# Patient Record
Sex: MALE | Race: WHITE | NOT HISPANIC OR LATINO | Employment: OTHER | URBAN - METROPOLITAN AREA
[De-identification: names, ages, dates, MRNs, and addresses within clinical notes are randomized per-mention and may not be internally consistent; named-entity substitution may affect disease eponyms.]

---

## 2023-04-09 DIAGNOSIS — I10 ESSENTIAL (PRIMARY) HYPERTENSION: ICD-10-CM

## 2023-04-10 RX ORDER — LOSARTAN POTASSIUM AND HYDROCHLOROTHIAZIDE 12.5; 1 MG/1; MG/1
TABLET ORAL
Qty: 90 TABLET | Refills: 3 | Status: SHIPPED | OUTPATIENT
Start: 2023-04-10 | End: 2023-10-13 | Stop reason: ALTCHOICE

## 2023-04-10 RX ORDER — ROSUVASTATIN CALCIUM 40 MG/1
1 TABLET, COATED ORAL DAILY
COMMUNITY
Start: 2023-04-06 | End: 2023-11-01

## 2023-04-10 RX ORDER — LOSARTAN POTASSIUM AND HYDROCHLOROTHIAZIDE 12.5; 1 MG/1; MG/1
1 TABLET ORAL DAILY
COMMUNITY
End: 2023-04-10 | Stop reason: ALTCHOICE

## 2023-04-10 RX ORDER — AMLODIPINE BESYLATE 10 MG/1
10 TABLET ORAL DAILY
COMMUNITY
End: 2023-07-11

## 2023-04-10 RX ORDER — ASPIRIN 81 MG/1
1 TABLET ORAL DAILY
COMMUNITY
Start: 2020-08-06

## 2023-06-14 ASSESSMENT — ENCOUNTER SYMPTOMS
CHOKING: 0
ANAL BLEEDING: 0
NUMBNESS: 0
EYE PAIN: 0
VOMITING: 0
EYE DISCHARGE: 0
POLYPHAGIA: 0
FREQUENCY: 0
DIARRHEA: 0
SORE THROAT: 0
DIFFICULTY URINATING: 0
DIZZINESS: 0
CONFUSION: 0
ARTHRALGIAS: 0
FACIAL SWELLING: 0
CONSTIPATION: 0
HEMATURIA: 0
FEVER: 0
SHORTNESS OF BREATH: 0
ABDOMINAL DISTENTION: 0
CHILLS: 0
WEAKNESS: 0
MYALGIAS: 0
SLEEP DISTURBANCE: 0
NERVOUS/ANXIOUS: 0
FATIGUE: 0
ABDOMINAL PAIN: 0
NAUSEA: 0
PALPITATIONS: 0
POLYDIPSIA: 0
COLOR CHANGE: 0
JOINT SWELLING: 0
WHEEZING: 0
APPETITE CHANGE: 0
HEADACHES: 0
CHEST TIGHTNESS: 0
TREMORS: 0
COUGH: 0

## 2023-06-14 NOTE — PROGRESS NOTES
"Subjective   Patient ID: Kentrell Munguia is a 67 y.o. male with a history of hypertension, DAVID, pulmonary nodules, current cigarette smoker, dyslipidemia, and prostate cancer who presents for Follow-up    HPI the patient occasionally monitors his blood pressure which is well controlled as reported by him.  He smokes half a pack a day and is not interested in quitting.  Denies shortness of breath or wheezing.  He follows with pulmonology regularly.  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   Today he denies shortness of breath or chest pain.  No fever or chills.      Review of Systems   Constitutional:  Negative for appetite change, chills, fatigue and fever.   HENT:  Negative for congestion, ear pain, facial swelling, hearing loss, nosebleeds and sore throat.    Eyes:  Negative for pain, discharge and visual disturbance.   Respiratory:  Negative for cough, choking, chest tightness, shortness of breath and wheezing.    Cardiovascular:  Negative for chest pain, palpitations and leg swelling.   Gastrointestinal:  Negative for abdominal distention, abdominal pain, anal bleeding, constipation, diarrhea, nausea and vomiting.   Endocrine: Negative for cold intolerance, heat intolerance, polydipsia, polyphagia and polyuria.   Genitourinary:  Negative for difficulty urinating, frequency, hematuria and urgency.   Musculoskeletal:  Negative for arthralgias, gait problem, joint swelling and myalgias.   Skin:  Negative for color change and rash.   Neurological:  Negative for dizziness, tremors, syncope, weakness, numbness and headaches.   Psychiatric/Behavioral:  Negative for behavioral problems, confusion, sleep disturbance and suicidal ideas. The patient is not nervous/anxious.        Objective   /80   Temp 36.2 °C (97.2 °F)   Ht 1.74 m (5' 8.5\")   Wt 103 kg (226 lb)   BMI 33.86 kg/m² "     Physical Exam  Constitutional:       General: He is not in acute distress.     Appearance: Normal appearance.   HENT:      Head: Normocephalic and atraumatic.      Nose: Nose normal.   Eyes:      Extraocular Movements: Extraocular movements intact.      Conjunctiva/sclera: Conjunctivae normal.      Pupils: Pupils are equal, round, and reactive to light.   Cardiovascular:      Rate and Rhythm: Normal rate and regular rhythm.      Pulses: Normal pulses.      Heart sounds: Normal heart sounds.      Comments: Varicose veins of lower extremities b/l  Pulmonary:      Effort: Pulmonary effort is normal.      Breath sounds: Normal breath sounds.   Abdominal:      General: Bowel sounds are normal.      Palpations: Abdomen is soft.   Musculoskeletal:         General: Normal range of motion.      Cervical back: Normal range of motion and neck supple.   Neurological:      General: No focal deficit present.      Mental Status: He is alert and oriented to person, place, and time.   Psychiatric:         Mood and Affect: Mood normal.         Behavior: Behavior normal.         Thought Content: Thought content normal.         Judgment: Judgment normal.       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  Will refer the patient to orthopedic oncology Dr. Hillman     Smoking addiction  Smokes half a pack  Not interested in quitting   ultrasound abdominal aorta normal      Pulmonary nodules  stable  Repeat CT chest in November 2023  Follow  up with pulmonology as scheduled     Obstructive sleep apnea  APAP, declined     Prostate cancer  stable  Continue Tamsulosin 0.4 mg daily  Follow up with urology as scheduled     CAD  Elevated CT cardiac score 273  Continue Rosuvastatin 20 mg at bed time  Continue Aspirin 81 mg daily  Try to lose weight  Exercise at least 30 minutes daily  Low-fat diet recommended  Stress test in 2020 normal  Follow-up with cardiology Dr. Acevedo      Hyperlipidemia  Continue Rosuvastatin 20 mg at bed time  low fat, low cholesterol diet  I recommend Mediterranean diet, which include fish, chicken, vegetables and olive oil  Exercise daily for 30 minutes daily or at least 3 times a week     Elevated hematocrit and red blood cells  Most likely due to smoking  Patient is not interested in quitting smoking  keep monitoring     Multiple colon polyps  Colonoscopy done in May 2021, repeat colonoscopy in 3 years    Continue multivitamins    BMI 33.86 kg/m²  Try to have as many home cooked meals as possible  Avoid processed foods which contain excess calories, sugar, and sodium.  Exercise 30 minutes daily    Health maintenance  Colonoscopy May 2021, repeat colonoscopy in 3 years  Prevnar 13 done in 2021, Prevnar 20 recommended  Shingrix recommended, have it done at the pharmacy  See your dentist twice a year  Yearly eye exam  see dermatology once a year for a skin check.    Please get your Living will / Advance directive completed if you do not have one already. Please make sure our office has a copy of the latest one.    We obtained blood work  I will call with results

## 2023-06-16 ENCOUNTER — OFFICE VISIT (OUTPATIENT)
Dept: PRIMARY CARE | Facility: CLINIC | Age: 68
End: 2023-06-16
Payer: MEDICARE

## 2023-06-16 VITALS
HEIGHT: 69 IN | BODY MASS INDEX: 33.47 KG/M2 | DIASTOLIC BLOOD PRESSURE: 80 MMHG | TEMPERATURE: 97.2 F | WEIGHT: 226 LBS | SYSTOLIC BLOOD PRESSURE: 126 MMHG

## 2023-06-16 DIAGNOSIS — R73.03 PREDIABETES: ICD-10-CM

## 2023-06-16 DIAGNOSIS — I10 BENIGN ESSENTIAL HYPERTENSION: ICD-10-CM

## 2023-06-16 DIAGNOSIS — I25.84 CORONARY ARTERY DISEASE DUE TO CALCIFIED CORONARY LESION: ICD-10-CM

## 2023-06-16 DIAGNOSIS — F17.219 CIGARETTE NICOTINE DEPENDENCE WITH NICOTINE-INDUCED DISORDER: ICD-10-CM

## 2023-06-16 DIAGNOSIS — C61 PROSTATE CANCER (MULTI): ICD-10-CM

## 2023-06-16 DIAGNOSIS — Z00.00 ROUTINE GENERAL MEDICAL EXAMINATION AT HEALTH CARE FACILITY: ICD-10-CM

## 2023-06-16 DIAGNOSIS — E55.9 MILD VITAMIN D DEFICIENCY: ICD-10-CM

## 2023-06-16 DIAGNOSIS — D12.6 TUBULOVILLOUS ADENOMA OF COLON: ICD-10-CM

## 2023-06-16 DIAGNOSIS — R91.8 MULTIPLE PULMONARY NODULES: ICD-10-CM

## 2023-06-16 DIAGNOSIS — Z00.00 ENCOUNTER FOR SUBSEQUENT ANNUAL WELLNESS VISIT (AWV) IN MEDICARE PATIENT: Primary | ICD-10-CM

## 2023-06-16 DIAGNOSIS — E78.5 DYSLIPIDEMIA: ICD-10-CM

## 2023-06-16 DIAGNOSIS — I25.10 CORONARY ARTERY DISEASE DUE TO CALCIFIED CORONARY LESION: ICD-10-CM

## 2023-06-16 DIAGNOSIS — J84.9 INTERSTITIAL LUNG DISEASE (MULTI): ICD-10-CM

## 2023-06-16 DIAGNOSIS — M89.9 BONE LESION: ICD-10-CM

## 2023-06-16 DIAGNOSIS — G47.33 OSA (OBSTRUCTIVE SLEEP APNEA): ICD-10-CM

## 2023-06-16 PROBLEM — M25.512 CHRONIC PAIN OF BOTH SHOULDERS: Status: ACTIVE | Noted: 2023-06-16

## 2023-06-16 PROBLEM — G89.29 CHRONIC PAIN OF BOTH SHOULDERS: Status: ACTIVE | Noted: 2023-06-16

## 2023-06-16 PROBLEM — F17.210 DEPENDENCE ON NICOTINE FROM CIGARETTES: Status: ACTIVE | Noted: 2023-06-16

## 2023-06-16 PROBLEM — M25.511 CHRONIC PAIN OF BOTH SHOULDERS: Status: ACTIVE | Noted: 2023-06-16

## 2023-06-16 PROCEDURE — 1159F MED LIST DOCD IN RCRD: CPT | Performed by: PHYSICIAN ASSISTANT

## 2023-06-16 PROCEDURE — 1170F FXNL STATUS ASSESSED: CPT | Performed by: PHYSICIAN ASSISTANT

## 2023-06-16 PROCEDURE — 3008F BODY MASS INDEX DOCD: CPT | Performed by: PHYSICIAN ASSISTANT

## 2023-06-16 PROCEDURE — 85025 COMPLETE CBC W/AUTO DIFF WBC: CPT | Performed by: PHYSICIAN ASSISTANT

## 2023-06-16 PROCEDURE — 4004F PT TOBACCO SCREEN RCVD TLK: CPT | Performed by: PHYSICIAN ASSISTANT

## 2023-06-16 PROCEDURE — 83036 HEMOGLOBIN GLYCOSYLATED A1C: CPT | Performed by: PHYSICIAN ASSISTANT

## 2023-06-16 PROCEDURE — 82306 VITAMIN D 25 HYDROXY: CPT | Performed by: PHYSICIAN ASSISTANT

## 2023-06-16 PROCEDURE — 84153 ASSAY OF PSA TOTAL: CPT | Performed by: PHYSICIAN ASSISTANT

## 2023-06-16 PROCEDURE — G0439 PPPS, SUBSEQ VISIT: HCPCS | Performed by: PHYSICIAN ASSISTANT

## 2023-06-16 PROCEDURE — 1160F RVW MEDS BY RX/DR IN RCRD: CPT | Performed by: PHYSICIAN ASSISTANT

## 2023-06-16 PROCEDURE — 99214 OFFICE O/P EST MOD 30 MIN: CPT | Performed by: PHYSICIAN ASSISTANT

## 2023-06-16 PROCEDURE — 84443 ASSAY THYROID STIM HORMONE: CPT | Performed by: PHYSICIAN ASSISTANT

## 2023-06-16 PROCEDURE — 3074F SYST BP LT 130 MM HG: CPT | Performed by: PHYSICIAN ASSISTANT

## 2023-06-16 PROCEDURE — 3079F DIAST BP 80-89 MM HG: CPT | Performed by: PHYSICIAN ASSISTANT

## 2023-06-16 PROCEDURE — 80053 COMPREHEN METABOLIC PANEL: CPT | Performed by: PHYSICIAN ASSISTANT

## 2023-06-16 PROCEDURE — 80061 LIPID PANEL: CPT | Performed by: PHYSICIAN ASSISTANT

## 2023-06-16 ASSESSMENT — ACTIVITIES OF DAILY LIVING (ADL)
ADEQUATE_TO_COMPLETE_ADL: YES
STIL DRIVING: YES
BATHING: INDEPENDENT
USING TRANSPORTATION: INDEPENDENT
TOILETING: INDEPENDENT
PILL BOX USED: NO
FEEDING YOURSELF: INDEPENDENT
DRESSING YOURSELF: INDEPENDENT
HEARING - RIGHT EAR: FUNCTIONAL
DOING HOUSEWORK: INDEPENDENT
PREPARING MEALS: INDEPENDENT
HEARING - LEFT EAR: FUNCTIONAL
TAKING_MEDICATION: INDEPENDENT
MANAGING_FINANCES: INDEPENDENT
PATIENT'S MEMORY ADEQUATE TO SAFELY COMPLETE DAILY ACTIVITIES?: YES
GROOMING: INDEPENDENT
MANAGING FINANCES: INDEPENDENT
TAKING MEDICATION: INDEPENDENT
BATHING: INDEPENDENT
DOING_HOUSEWORK: INDEPENDENT
GROCERY_SHOPPING: INDEPENDENT
USING TELEPHONE: INDEPENDENT
DRESSING: INDEPENDENT
JUDGMENT_ADEQUATE_SAFELY_COMPLETE_DAILY_ACTIVITIES: YES
WALKS IN HOME: INDEPENDENT
GROCERY SHOPPING: INDEPENDENT
EATING: INDEPENDENT
NEEDS ASSISTANCE WITH FOOD: INDEPENDENT

## 2023-06-16 ASSESSMENT — PATIENT HEALTH QUESTIONNAIRE - PHQ9
1. LITTLE INTEREST OR PLEASURE IN DOING THINGS: NOT AT ALL
2. FEELING DOWN, DEPRESSED OR HOPELESS: NOT AT ALL
SUM OF ALL RESPONSES TO PHQ9 QUESTIONS 1 AND 2: 0

## 2023-06-16 ASSESSMENT — ENCOUNTER SYMPTOMS
OCCASIONAL FEELINGS OF UNSTEADINESS: 0
LOSS OF SENSATION IN FEET: 0
DEPRESSION: 0

## 2023-06-16 ASSESSMENT — GERIATRIC MINI NUTRITIONAL ASSESSMENT (MNA)
C GENERAL MOBILITY: GOES OUT
B WEIGHT LOSS DURING THE LAST 3 MONTHS: NO WEIGHT LOSS
D HAS SUFFERED PSYCHOLOGICAL STRESS OR ACUTE DISEASE IN THE PAST 3 MONTHS?: NO
E NEUROPSYCHOLOGICAL PROBLEMS: NO PSYCHOLOGICAL PROBLEMS
A HAS FOOD INTAKE DECLINED OVER THE PAST 3 MONTHS DUE TO LOSS OF APPETITE, DIGESTIVE PROBLEMS, CHEWING OR SWALLOWING DIFFICULTIES?: NO DECREASE IN FOOD INTAKE

## 2023-06-16 ASSESSMENT — COLUMBIA-SUICIDE SEVERITY RATING SCALE - C-SSRS
6. HAVE YOU EVER DONE ANYTHING, STARTED TO DO ANYTHING, OR PREPARED TO DO ANYTHING TO END YOUR LIFE?: NO
1. IN THE PAST MONTH, HAVE YOU WISHED YOU WERE DEAD OR WISHED YOU COULD GO TO SLEEP AND NOT WAKE UP?: NO
2. HAVE YOU ACTUALLY HAD ANY THOUGHTS OF KILLING YOURSELF?: NO

## 2023-06-16 ASSESSMENT — COGNITIVE AND FUNCTIONAL STATUS - GENERAL: TRAIL MAKING TEST: PATIENT COMPLETES TRAIL MAKING TEST PROPERLY.

## 2023-06-16 NOTE — ASSESSMENT & PLAN NOTE
Continue Rosuvastatin 20 mg at bed time  Continue Aspirin 81 mg daily  Try to lose weight  Exercise at least 30 minutes daily  Low-fat diet recommended  Stress test in 2020 normal  Follow-up with cardiology Dr. Acevedo

## 2023-06-16 NOTE — PROGRESS NOTES
"Subjective   Reason for Visit: Kentrell Munguia is an 67 y.o. male here for a Medicare Wellness visit.     Past Medical, Surgical, and Family History reviewed and updated in chart.    Reviewed all medications by prescribing practitioner or clinical pharmacist (such as prescriptions, OTCs, herbal therapies and supplements) and documented in the medical record.    HPI    Patient Care Team:  Odalis Avila PA-C as PCP - General  Leopoldo Conde MD as PCP - [facility] Attributed Provider     Review of Systems    Objective   Vitals:  /80   Temp 36.2 °C (97.2 °F)   Ht 1.74 m (5' 8.5\")   Wt 103 kg (226 lb)   BMI 33.86 kg/m²       Physical Exam    Assessment/Plan   Problem List Items Addressed This Visit       Benign essential hypertension    Current Assessment & Plan     Blood pressure is well controlled  Continue amlodipine 10 mg once daily   Continue losartanâ€“HCTZ 100-12.5 mg daily  No added salt diet, do not add salt to your food   Try to exercise every day for 30 minutes         Relevant Orders    CBC    Comprehensive Metabolic Panel    Thyroid Stimulating Hormone    Dependence on nicotine from cigarettes    Current Assessment & Plan     Smokes half a pack   Not interested in quitting  Ultrasound abdominal aorta normal          Dyslipidemia    Relevant Orders    Lipid Panel    Interstitial lung disease (CMS/HCC)    Mild vitamin D deficiency    Relevant Orders    Vitamin D, Total    Multiple pulmonary nodules    Current Assessment & Plan     stable  Repeat CT chest in November 2023  Follow up with pulmonology as scheduled         DAVID (obstructive sleep apnea)    Current Assessment & Plan     APAP declined         Prostate cancer (CMS/HCC)    Current Assessment & Plan     stable  Continue Tamsulosin 0.4 mg daily  Follow up with urology as scheduled             Relevant Orders    Prostate Specific Antigen    Tubulovillous adenoma of colon    Current Assessment & Plan     Colonoscopy done in May 2021, " repeat colonoscopy in 3 years         Coronary artery disease due to calcified coronary lesion    Current Assessment & Plan     Continue Rosuvastatin 20 mg at bed time  Continue Aspirin 81 mg daily  Try to lose weight  Exercise at least 30 minutes daily  Low-fat diet recommended  Stress test in 2020 normal  Follow-up with cardiology Dr. Acevedo          Encounter for subsequent annual wellness visit (AWV) in Medicare patient - Primary    Bone lesion    Relevant Orders    Referral to Orthopaedic Surgery    Prediabetes    Relevant Orders    Hemoglobin A1C    BMI 33.0-33.9,adult

## 2023-06-16 NOTE — ASSESSMENT & PLAN NOTE
Blood pressure is well controlled  Continue amlodipine 10 mg once daily   Continue losartanâ€“HCTZ 100-12.5 mg daily  No added salt diet, do not add salt to your food   Try to exercise every day for 30 minutes

## 2023-06-20 DIAGNOSIS — E87.5 HYPERKALEMIA: Primary | ICD-10-CM

## 2023-07-07 ENCOUNTER — TELEPHONE (OUTPATIENT)
Dept: PRIMARY CARE | Facility: CLINIC | Age: 68
End: 2023-07-07

## 2023-07-07 ENCOUNTER — LAB (OUTPATIENT)
Dept: LAB | Facility: LAB | Age: 68
End: 2023-07-07
Payer: MEDICARE

## 2023-07-07 DIAGNOSIS — E87.5 HYPERKALEMIA: ICD-10-CM

## 2023-07-07 LAB
ANION GAP IN SER/PLAS: 13 MMOL/L (ref 10–20)
CALCIUM (MG/DL) IN SER/PLAS: 9.1 MG/DL (ref 8.6–10.3)
CARBON DIOXIDE, TOTAL (MMOL/L) IN SER/PLAS: 26 MMOL/L (ref 21–32)
CHLORIDE (MMOL/L) IN SER/PLAS: 101 MMOL/L (ref 98–107)
CREATININE (MG/DL) IN SER/PLAS: 0.98 MG/DL (ref 0.5–1.3)
GFR MALE: 85 ML/MIN/1.73M2
GLUCOSE (MG/DL) IN SER/PLAS: 119 MG/DL (ref 74–99)
POTASSIUM (MMOL/L) IN SER/PLAS: 4.1 MMOL/L (ref 3.5–5.3)
SODIUM (MMOL/L) IN SER/PLAS: 136 MMOL/L (ref 136–145)
UREA NITROGEN (MG/DL) IN SER/PLAS: 19 MG/DL (ref 6–23)

## 2023-07-07 PROCEDURE — 36415 COLL VENOUS BLD VENIPUNCTURE: CPT

## 2023-07-07 PROCEDURE — 80048 BASIC METABOLIC PNL TOTAL CA: CPT

## 2023-07-11 DIAGNOSIS — I10 ESSENTIAL (PRIMARY) HYPERTENSION: ICD-10-CM

## 2023-07-11 RX ORDER — AMLODIPINE BESYLATE 10 MG/1
10 TABLET ORAL DAILY
Qty: 90 TABLET | Refills: 2 | Status: SHIPPED | OUTPATIENT
Start: 2023-07-11 | End: 2023-09-15 | Stop reason: SINTOL

## 2023-07-11 RX ORDER — TAMSULOSIN HYDROCHLORIDE 0.4 MG/1
CAPSULE ORAL
COMMUNITY
Start: 2020-08-13

## 2023-09-15 ENCOUNTER — TELEPHONE (OUTPATIENT)
Dept: PRIMARY CARE | Facility: CLINIC | Age: 68
End: 2023-09-15
Payer: MEDICARE

## 2023-09-15 DIAGNOSIS — I10 BENIGN ESSENTIAL HYPERTENSION: Primary | ICD-10-CM

## 2023-09-15 DIAGNOSIS — R91.8 MULTIPLE PULMONARY NODULES: ICD-10-CM

## 2023-09-15 RX ORDER — METOPROLOL TARTRATE 25 MG/1
25 TABLET, FILM COATED ORAL 2 TIMES DAILY
Qty: 60 TABLET | Refills: 3 | Status: SHIPPED | OUTPATIENT
Start: 2023-09-15 | End: 2023-09-22

## 2023-09-15 NOTE — TELEPHONE ENCOUNTER
KENTRELL>>walked in needed to ask a few questions.    Leopoldo faith, [place], is there some one else you can recommend for him for respiratory. Or can he use any one else .  He is on Amlodipine - it is making gums swell.  Will he need to try something else ?  Please call Kentrell perez @ [phone]

## 2023-09-22 ENCOUNTER — TELEPHONE (OUTPATIENT)
Dept: PRIMARY CARE | Facility: CLINIC | Age: 68
End: 2023-09-22

## 2023-09-22 DIAGNOSIS — I10 BENIGN ESSENTIAL HYPERTENSION: Primary | ICD-10-CM

## 2023-09-22 RX ORDER — METOPROLOL TARTRATE 50 MG/1
50 TABLET ORAL 2 TIMES DAILY
Qty: 60 TABLET | Refills: 6 | Status: SHIPPED | OUTPATIENT
Start: 2023-09-22 | End: 2023-09-29 | Stop reason: SINTOL

## 2023-09-22 NOTE — TELEPHONE ENCOUNTER
Medication - issue- BP -Issue    Morning BP higher ranging -140 -150 in afternoon is better 130  Kentrell takes 1 tablet in the morning and 1 at night.  Kentrell wants to know what to do? Or if he needs to change medication.   Will be unavailable after 1 pm wedding rehearsal rest of the day.

## 2023-09-29 ENCOUNTER — TELEPHONE (OUTPATIENT)
Dept: PRIMARY CARE | Facility: CLINIC | Age: 68
End: 2023-09-29

## 2023-09-29 DIAGNOSIS — I10 BENIGN ESSENTIAL HYPERTENSION: Primary | ICD-10-CM

## 2023-09-29 RX ORDER — DOXAZOSIN 2 MG/1
2 TABLET ORAL NIGHTLY
Qty: 30 TABLET | Refills: 5 | Status: SHIPPED | OUTPATIENT
Start: 2023-09-29 | End: 2023-10-13

## 2023-10-06 DIAGNOSIS — I10 BENIGN ESSENTIAL HYPERTENSION: ICD-10-CM

## 2023-10-13 RX ORDER — DOXAZOSIN 4 MG/1
4 TABLET ORAL NIGHTLY
Qty: 30 TABLET | Refills: 5 | Status: SHIPPED | OUTPATIENT
Start: 2023-10-13 | End: 2024-03-08 | Stop reason: SDUPTHER

## 2023-10-13 RX ORDER — LOSARTAN POTASSIUM AND HYDROCHLOROTHIAZIDE 25; 100 MG/1; MG/1
1 TABLET ORAL DAILY
Qty: 30 TABLET | Refills: 11 | Status: SHIPPED | OUTPATIENT
Start: 2023-10-13 | End: 2024-10-12

## 2023-10-13 NOTE — TELEPHONE ENCOUNTER
The patient called with his blood pressure results.  His blood pressure runs in low 140s/90 mmHg.  I increased his losartan-HCTZ 100-12.5 to 100-25 mg.  I instructed him to continue monitoring his blood pressure and if it is low, he needs to decrease doxazosin to 2 mg and call me with his blood pressure results in the week

## 2023-11-01 DIAGNOSIS — E78.5 HYPERLIPIDEMIA, UNSPECIFIED: ICD-10-CM

## 2023-11-01 RX ORDER — ROSUVASTATIN CALCIUM 40 MG/1
40 TABLET, COATED ORAL DAILY
Qty: 90 TABLET | Refills: 3 | Status: SHIPPED | OUTPATIENT
Start: 2023-11-01

## 2023-11-20 ENCOUNTER — HOSPITAL ENCOUNTER (OUTPATIENT)
Dept: RADIOLOGY | Facility: HOSPITAL | Age: 68
Discharge: HOME | End: 2023-11-20
Payer: MEDICARE

## 2023-11-20 DIAGNOSIS — F17.200 NICOTINE DEPENDENCE, UNSPECIFIED, UNCOMPLICATED: ICD-10-CM

## 2023-11-20 PROCEDURE — 71271 CT THORAX LUNG CANCER SCR C-: CPT

## 2023-11-20 PROCEDURE — 71271 CT THORAX LUNG CANCER SCR C-: CPT | Performed by: RADIOLOGY

## 2023-12-15 RX ORDER — AMLODIPINE BESYLATE 10 MG/1
10 TABLET ORAL DAILY
COMMUNITY
Start: 2023-09-27

## 2023-12-18 ENCOUNTER — OFFICE VISIT (OUTPATIENT)
Dept: PRIMARY CARE | Facility: CLINIC | Age: 68
End: 2023-12-18
Payer: MEDICARE

## 2023-12-18 VITALS
WEIGHT: 220 LBS | SYSTOLIC BLOOD PRESSURE: 100 MMHG | BODY MASS INDEX: 32.58 KG/M2 | DIASTOLIC BLOOD PRESSURE: 60 MMHG | TEMPERATURE: 96.9 F | HEIGHT: 69 IN

## 2023-12-18 DIAGNOSIS — F17.200 SMOKING ADDICTION: ICD-10-CM

## 2023-12-18 DIAGNOSIS — E78.5 DYSLIPIDEMIA: ICD-10-CM

## 2023-12-18 DIAGNOSIS — R73.03 PREDIABETES: ICD-10-CM

## 2023-12-18 DIAGNOSIS — E55.9 MILD VITAMIN D DEFICIENCY: ICD-10-CM

## 2023-12-18 DIAGNOSIS — I10 BENIGN ESSENTIAL HYPERTENSION: Primary | ICD-10-CM

## 2023-12-18 DIAGNOSIS — C61 PROSTATE CANCER (MULTI): ICD-10-CM

## 2023-12-18 PROBLEM — D12.6 TUBULAR ADENOMA OF COLON: Status: ACTIVE | Noted: 2023-12-18

## 2023-12-18 PROCEDURE — 1160F RVW MEDS BY RX/DR IN RCRD: CPT | Performed by: PHYSICIAN ASSISTANT

## 2023-12-18 PROCEDURE — 83036 HEMOGLOBIN GLYCOSYLATED A1C: CPT | Performed by: PHYSICIAN ASSISTANT

## 2023-12-18 PROCEDURE — 4004F PT TOBACCO SCREEN RCVD TLK: CPT | Performed by: PHYSICIAN ASSISTANT

## 2023-12-18 PROCEDURE — 80048 BASIC METABOLIC PNL TOTAL CA: CPT | Performed by: PHYSICIAN ASSISTANT

## 2023-12-18 PROCEDURE — 80061 LIPID PANEL: CPT | Performed by: PHYSICIAN ASSISTANT

## 2023-12-18 PROCEDURE — 99214 OFFICE O/P EST MOD 30 MIN: CPT | Performed by: PHYSICIAN ASSISTANT

## 2023-12-18 PROCEDURE — 85025 COMPLETE CBC W/AUTO DIFF WBC: CPT | Performed by: PHYSICIAN ASSISTANT

## 2023-12-18 PROCEDURE — 3074F SYST BP LT 130 MM HG: CPT | Performed by: PHYSICIAN ASSISTANT

## 2023-12-18 PROCEDURE — 1159F MED LIST DOCD IN RCRD: CPT | Performed by: PHYSICIAN ASSISTANT

## 2023-12-18 PROCEDURE — 84450 TRANSFERASE (AST) (SGOT): CPT | Performed by: PHYSICIAN ASSISTANT

## 2023-12-18 PROCEDURE — 1170F FXNL STATUS ASSESSED: CPT | Performed by: PHYSICIAN ASSISTANT

## 2023-12-18 PROCEDURE — 3078F DIAST BP <80 MM HG: CPT | Performed by: PHYSICIAN ASSISTANT

## 2023-12-18 PROCEDURE — 3008F BODY MASS INDEX DOCD: CPT | Performed by: PHYSICIAN ASSISTANT

## 2023-12-18 PROCEDURE — 84460 ALANINE AMINO (ALT) (SGPT): CPT | Performed by: PHYSICIAN ASSISTANT

## 2023-12-18 ASSESSMENT — ACTIVITIES OF DAILY LIVING (ADL)
HEARING - LEFT EAR: FUNCTIONAL
DOING HOUSEWORK: INDEPENDENT
PREPARING MEALS: INDEPENDENT
FEEDING YOURSELF: INDEPENDENT
TAKING MEDICATION: INDEPENDENT
DRESSING: INDEPENDENT
BATHING: INDEPENDENT
TOILETING: INDEPENDENT
FEEDING: INDEPENDENT
EATING: INDEPENDENT
STIL DRIVING: YES
TOILETING: INDEPENDENT
ADEQUATE_TO_COMPLETE_ADL: YES
WALKS IN HOME: INDEPENDENT
PATIENT'S MEMORY ADEQUATE TO SAFELY COMPLETE DAILY ACTIVITIES?: YES
NEEDS ASSISTANCE WITH FOOD: INDEPENDENT
HEARING - RIGHT EAR: FUNCTIONAL
MANAGING FINANCES: INDEPENDENT
ADEQUATE_TO_COMPLETE_ADL: YES
ASSISTIVE_DEVICE: EYEGLASSES
USING TRANSPORTATION: INDEPENDENT
JUDGMENT_ADEQUATE_SAFELY_COMPLETE_DAILY_ACTIVITIES: YES
DRESSING YOURSELF: INDEPENDENT
PILL BOX USED: YES
GROOMING: INDEPENDENT
GROCERY SHOPPING: INDEPENDENT
USING TELEPHONE: INDEPENDENT
JUDGMENT_ADEQUATE_SAFELY_COMPLETE_DAILY_ACTIVITIES: YES
BATHING: INDEPENDENT

## 2023-12-18 ASSESSMENT — ENCOUNTER SYMPTOMS
CHOKING: 0
ABDOMINAL PAIN: 0
VOMITING: 0
NAUSEA: 0
EYE DISCHARGE: 0
DIFFICULTY URINATING: 0
NUMBNESS: 0
CONFUSION: 0
MYALGIAS: 0
POLYDIPSIA: 0
OCCASIONAL FEELINGS OF UNSTEADINESS: 0
ARTHRALGIAS: 0
FEVER: 0
SHORTNESS OF BREATH: 0
EYE PAIN: 0
JOINT SWELLING: 0
NERVOUS/ANXIOUS: 0
FACIAL SWELLING: 0
HEMATURIA: 0
WEAKNESS: 0
LOSS OF SENSATION IN FEET: 0
FATIGUE: 0
HEADACHES: 0
POLYPHAGIA: 0
CHILLS: 0
PALPITATIONS: 0
DIZZINESS: 0
SORE THROAT: 0
SLEEP DISTURBANCE: 0
DIARRHEA: 0
COUGH: 0
FREQUENCY: 0
CONSTIPATION: 0
CHEST TIGHTNESS: 0
WHEEZING: 0
APPETITE CHANGE: 0
ANAL BLEEDING: 0
TREMORS: 0
DEPRESSION: 0
COLOR CHANGE: 0
ABDOMINAL DISTENTION: 0

## 2023-12-18 ASSESSMENT — GERIATRIC MINI NUTRITIONAL ASSESSMENT (MNA)
C GENERAL MOBILITY: GOES OUT
B WEIGHT LOSS DURING THE LAST 3 MONTHS: NO WEIGHT LOSS
A HAS FOOD INTAKE DECLINED OVER THE PAST 3 MONTHS DUE TO LOSS OF APPETITE, DIGESTIVE PROBLEMS, CHEWING OR SWALLOWING DIFFICULTIES?: NO DECREASE IN FOOD INTAKE
E NEUROPSYCHOLOGICAL PROBLEMS: NO PSYCHOLOGICAL PROBLEMS
D HAS SUFFERED PSYCHOLOGICAL STRESS OR ACUTE DISEASE IN THE PAST 3 MONTHS?: NO

## 2023-12-18 ASSESSMENT — PATIENT HEALTH QUESTIONNAIRE - PHQ9
2. FEELING DOWN, DEPRESSED OR HOPELESS: NOT AT ALL
SUM OF ALL RESPONSES TO PHQ9 QUESTIONS 1 AND 2: 0
1. LITTLE INTEREST OR PLEASURE IN DOING THINGS: NOT AT ALL

## 2023-12-18 ASSESSMENT — COLUMBIA-SUICIDE SEVERITY RATING SCALE - C-SSRS
1. IN THE PAST MONTH, HAVE YOU WISHED YOU WERE DEAD OR WISHED YOU COULD GO TO SLEEP AND NOT WAKE UP?: NO
6. HAVE YOU EVER DONE ANYTHING, STARTED TO DO ANYTHING, OR PREPARED TO DO ANYTHING TO END YOUR LIFE?: NO
2. HAVE YOU ACTUALLY HAD ANY THOUGHTS OF KILLING YOURSELF?: NO

## 2023-12-26 ENCOUNTER — TELEMEDICINE (OUTPATIENT)
Dept: PULMONOLOGY | Facility: CLINIC | Age: 68
End: 2023-12-26
Payer: MEDICARE

## 2023-12-26 DIAGNOSIS — F17.200 SMOKING ADDICTION: ICD-10-CM

## 2023-12-26 DIAGNOSIS — R91.8 LUNG NODULES: Primary | ICD-10-CM

## 2023-12-26 DIAGNOSIS — F17.210 CIGARETTE SMOKER: ICD-10-CM

## 2023-12-26 PROCEDURE — 99442 PR PHYS/QHP TELEPHONE EVALUATION 11-20 MIN: CPT | Performed by: INTERNAL MEDICINE

## 2023-12-26 ASSESSMENT — COLUMBIA-SUICIDE SEVERITY RATING SCALE - C-SSRS
2. HAVE YOU ACTUALLY HAD ANY THOUGHTS OF KILLING YOURSELF?: NO
6. HAVE YOU EVER DONE ANYTHING, STARTED TO DO ANYTHING, OR PREPARED TO DO ANYTHING TO END YOUR LIFE?: NO

## 2023-12-26 ASSESSMENT — ENCOUNTER SYMPTOMS: DEPRESSION: 0

## 2023-12-26 NOTE — PROGRESS NOTES
This is a telephone encounter    68-year-old man presenting for follow-up on lung nodules, used to follow with Dr. Conde, last seen in November 2022.   Bilateral upper lobe nodule infiltrates waxing and waning, improved on CT from November 2023 compared to CT from last year, suspect inflammatory or smoking-related.  Patient quit smoking about a week ago-congratulated and encouraged not to smoke again.  -Follow-up with CT chest without contrast/low-dose CT screening in 1 year.  Follow-up with me after CT or sooner with any concerns.  Currently asymptomatic    This dictation was created using voice recognition software. Phonetic and/or minor grammatical errors may exist.  50 minutes spent on today's visit.

## 2024-01-04 ENCOUNTER — HOSPITAL ENCOUNTER (OUTPATIENT)
Dept: RADIOLOGY | Facility: HOSPITAL | Age: 69
Discharge: HOME | End: 2024-01-04
Payer: MEDICARE

## 2024-01-04 ENCOUNTER — OFFICE VISIT (OUTPATIENT)
Dept: ORTHOPEDIC SURGERY | Facility: CLINIC | Age: 69
End: 2024-01-04
Payer: MEDICARE

## 2024-01-04 VITALS — HEIGHT: 69 IN | WEIGHT: 225 LBS | BODY MASS INDEX: 33.33 KG/M2

## 2024-01-04 DIAGNOSIS — M89.9 DISORDER OF BONE, UNSPECIFIED: ICD-10-CM

## 2024-01-04 DIAGNOSIS — M89.9 BONE LESION: Primary | ICD-10-CM

## 2024-01-04 PROCEDURE — 1159F MED LIST DOCD IN RCRD: CPT | Performed by: STUDENT IN AN ORGANIZED HEALTH CARE EDUCATION/TRAINING PROGRAM

## 2024-01-04 PROCEDURE — 99214 OFFICE O/P EST MOD 30 MIN: CPT | Performed by: STUDENT IN AN ORGANIZED HEALTH CARE EDUCATION/TRAINING PROGRAM

## 2024-01-04 PROCEDURE — 3008F BODY MASS INDEX DOCD: CPT | Performed by: STUDENT IN AN ORGANIZED HEALTH CARE EDUCATION/TRAINING PROGRAM

## 2024-01-04 PROCEDURE — 73502 X-RAY EXAM HIP UNI 2-3 VIEWS: CPT | Mod: LT

## 2024-01-04 PROCEDURE — 73502 X-RAY EXAM HIP UNI 2-3 VIEWS: CPT | Mod: LEFT SIDE | Performed by: RADIOLOGY

## 2024-01-04 PROCEDURE — 1160F RVW MEDS BY RX/DR IN RCRD: CPT | Performed by: STUDENT IN AN ORGANIZED HEALTH CARE EDUCATION/TRAINING PROGRAM

## 2024-01-04 ASSESSMENT — ENCOUNTER SYMPTOMS
OCCASIONAL FEELINGS OF UNSTEADINESS: 0
LOSS OF SENSATION IN FEET: 0
DEPRESSION: 0

## 2024-01-04 ASSESSMENT — PAIN - FUNCTIONAL ASSESSMENT: PAIN_FUNCTIONAL_ASSESSMENT: NO/DENIES PAIN

## 2024-01-04 NOTE — PROGRESS NOTES
Orthopaedic Surgery Clinic Progress Note:    CC: Follow up left proximal femur lesion    S: 68 y.o. male with a history of incidentally found left proximal femoral lesion which was initially noticed on MRI in 2022 and referred to me after a CT scan in early 2023.  We have been documenting radiographic stability over time with sequential radiographs.  Currently he still states he is asymptomatic.  Denies any numbness or tingling.  Denies any trauma.  He has no pain in the area of the left hip or any pain with weightbearing.    Objective:    Exam:  Gen: alert, appropriately conversational, no apparent distress  Chest: symmetric chest rise, non-labored breathing  Heart: RRR to peripheral palpation    Physical exam of the left lower extremity reveals no pain with hip range of motion. He has excellent quadricep strength. No pain with axial loading. No obvious lymphadenopathy. EHL, dorsiflexion and plantarflexion are intact. He has palpable pulses and brisk capillary fill distally. Sensation is intact light touch in all distributions.     Imaging:  Radiographs were obtained and independently interpreted by myself which shows a very subtle slightly sclerotic lesion within the inferior femoral neck. No obvious aggressive features such as periosteal reaction, cortical thinning or destruction or soft tissue mass. CT scan which was obtained for comparison in January 2023 demonstrates similar lesion with no aggressive features as mentioned above.  There is no change compared to radiographs obtained in July 2023 and also no change in overall size compared to MRI which was obtained in 2022.  Likely representative of an old fibro-osseous lesion.     A/P:    At this point I think we have now documented adequate radiographic stability over time taking into consideration his previous MRI, CT scan as well as sequential radiographs.  By recommendation is that this needs no further follow-up at this time.  If he begins to develop pain  in this area but certainly I want him to call the office to return for repeat radiographs but otherwise he can follow-up with us on an as-needed basis moving forward.

## 2024-03-08 DIAGNOSIS — I10 BENIGN ESSENTIAL HYPERTENSION: ICD-10-CM

## 2024-03-08 RX ORDER — DOXAZOSIN 4 MG/1
4 TABLET ORAL NIGHTLY
Qty: 90 TABLET | Refills: 3 | Status: SHIPPED | OUTPATIENT
Start: 2024-03-08 | End: 2025-03-03

## 2024-06-20 RX ORDER — VARENICLINE TARTRATE 0.5 (11)-1
KIT ORAL
COMMUNITY
Start: 2020-08-06 | End: 2024-06-24 | Stop reason: ALTCHOICE

## 2024-06-24 ENCOUNTER — CONSULT (OUTPATIENT)
Dept: PHYSICAL MEDICINE AND REHAB | Facility: CLINIC | Age: 69
End: 2024-06-24
Payer: MEDICARE

## 2024-06-24 ENCOUNTER — APPOINTMENT (OUTPATIENT)
Dept: PRIMARY CARE | Facility: CLINIC | Age: 69
End: 2024-06-24
Payer: MEDICARE

## 2024-06-24 ENCOUNTER — HOSPITAL ENCOUNTER (OUTPATIENT)
Dept: RADIOLOGY | Facility: CLINIC | Age: 69
Discharge: HOME | End: 2024-06-24
Payer: MEDICARE

## 2024-06-24 VITALS
HEIGHT: 70 IN | WEIGHT: 233 LBS | HEART RATE: 70 BPM | SYSTOLIC BLOOD PRESSURE: 139 MMHG | BODY MASS INDEX: 33.36 KG/M2 | TEMPERATURE: 97.7 F | DIASTOLIC BLOOD PRESSURE: 76 MMHG

## 2024-06-24 VITALS
HEIGHT: 69 IN | SYSTOLIC BLOOD PRESSURE: 110 MMHG | DIASTOLIC BLOOD PRESSURE: 62 MMHG | BODY MASS INDEX: 33.47 KG/M2 | WEIGHT: 226 LBS

## 2024-06-24 DIAGNOSIS — M25.551 RIGHT HIP PAIN: ICD-10-CM

## 2024-06-24 DIAGNOSIS — M76.891 TENDONITIS OF RIGHT HIP: ICD-10-CM

## 2024-06-24 DIAGNOSIS — F17.219 CIGARETTE NICOTINE DEPENDENCE WITH NICOTINE-INDUCED DISORDER: ICD-10-CM

## 2024-06-24 DIAGNOSIS — E55.9 MILD VITAMIN D DEFICIENCY: ICD-10-CM

## 2024-06-24 DIAGNOSIS — Z12.11 ENCOUNTER FOR SCREENING FOR MALIGNANT NEOPLASM OF COLON: ICD-10-CM

## 2024-06-24 DIAGNOSIS — F17.200 SMOKING ADDICTION: ICD-10-CM

## 2024-06-24 DIAGNOSIS — M47.816 LUMBAR SPONDYLOSIS: ICD-10-CM

## 2024-06-24 DIAGNOSIS — J84.9 INTERSTITIAL LUNG DISEASE (MULTI): ICD-10-CM

## 2024-06-24 DIAGNOSIS — Z00.00 ENCOUNTER FOR SUBSEQUENT ANNUAL WELLNESS VISIT (AWV) IN MEDICARE PATIENT: Primary | ICD-10-CM

## 2024-06-24 DIAGNOSIS — M16.11 ARTHRITIS OF RIGHT HIP: Primary | ICD-10-CM

## 2024-06-24 DIAGNOSIS — L02.212 CUTANEOUS ABSCESS OF BACK EXCLUDING BUTTOCKS: ICD-10-CM

## 2024-06-24 DIAGNOSIS — G47.33 OSA (OBSTRUCTIVE SLEEP APNEA): ICD-10-CM

## 2024-06-24 DIAGNOSIS — C61 PROSTATE CANCER (MULTI): ICD-10-CM

## 2024-06-24 DIAGNOSIS — M89.9 BONE LESION: ICD-10-CM

## 2024-06-24 DIAGNOSIS — E78.5 DYSLIPIDEMIA: ICD-10-CM

## 2024-06-24 DIAGNOSIS — Z00.00 ROUTINE GENERAL MEDICAL EXAMINATION AT HEALTH CARE FACILITY: ICD-10-CM

## 2024-06-24 DIAGNOSIS — R73.03 PREDIABETES: ICD-10-CM

## 2024-06-24 DIAGNOSIS — M25.551 RIGHT HIP PAIN: Primary | ICD-10-CM

## 2024-06-24 DIAGNOSIS — I10 BENIGN ESSENTIAL HYPERTENSION: ICD-10-CM

## 2024-06-24 PROBLEM — R35.1 NOCTURIA: Status: ACTIVE | Noted: 2024-06-24

## 2024-06-24 LAB
CREAT UR STRIP-MCNC: 200 MG/DL
MICROALBUMIN UR TEST STR-MCNC: 30 MG/L
PROT/CREAT UR: <30 UG/MG CREAT

## 2024-06-24 PROCEDURE — 82043 UR ALBUMIN QUANTITATIVE: CPT | Mod: CLIA WAIVED TEST | Performed by: PHYSICIAN ASSISTANT

## 2024-06-24 PROCEDURE — 99214 OFFICE O/P EST MOD 30 MIN: CPT | Performed by: PHYSICIAN ASSISTANT

## 2024-06-24 PROCEDURE — 85025 COMPLETE CBC W/AUTO DIFF WBC: CPT | Performed by: PHYSICIAN ASSISTANT

## 2024-06-24 PROCEDURE — 3078F DIAST BP <80 MM HG: CPT | Performed by: PHYSICIAN ASSISTANT

## 2024-06-24 PROCEDURE — 84460 ALANINE AMINO (ALT) (SGPT): CPT | Performed by: PHYSICIAN ASSISTANT

## 2024-06-24 PROCEDURE — 1160F RVW MEDS BY RX/DR IN RCRD: CPT | Performed by: PHYSICIAN ASSISTANT

## 2024-06-24 PROCEDURE — 1159F MED LIST DOCD IN RCRD: CPT | Performed by: PHYSICIAN ASSISTANT

## 2024-06-24 PROCEDURE — 84153 ASSAY OF PSA TOTAL: CPT | Performed by: PHYSICIAN ASSISTANT

## 2024-06-24 PROCEDURE — 73502 X-RAY EXAM HIP UNI 2-3 VIEWS: CPT | Mod: RT

## 2024-06-24 PROCEDURE — G0439 PPPS, SUBSEQ VISIT: HCPCS | Performed by: PHYSICIAN ASSISTANT

## 2024-06-24 PROCEDURE — 73502 X-RAY EXAM HIP UNI 2-3 VIEWS: CPT | Mod: RIGHT SIDE | Performed by: RADIOLOGY

## 2024-06-24 PROCEDURE — 73502 X-RAY EXAM HIP UNI 2-3 VIEWS: CPT | Mod: LT

## 2024-06-24 PROCEDURE — 90471 IMMUNIZATION ADMIN: CPT | Performed by: PHYSICIAN ASSISTANT

## 2024-06-24 PROCEDURE — 90715 TDAP VACCINE 7 YRS/> IM: CPT | Performed by: PHYSICIAN ASSISTANT

## 2024-06-24 PROCEDURE — 99213 OFFICE O/P EST LOW 20 MIN: CPT | Performed by: PHYSICAL MEDICINE & REHABILITATION

## 2024-06-24 PROCEDURE — 1170F FXNL STATUS ASSESSED: CPT | Performed by: PHYSICIAN ASSISTANT

## 2024-06-24 PROCEDURE — 82570 ASSAY OF URINE CREATININE: CPT | Performed by: PHYSICIAN ASSISTANT

## 2024-06-24 PROCEDURE — 4004F PT TOBACCO SCREEN RCVD TLK: CPT | Performed by: PHYSICIAN ASSISTANT

## 2024-06-24 PROCEDURE — 82570 ASSAY OF URINE CREATININE: CPT | Mod: CLIA WAIVED TEST | Performed by: PHYSICIAN ASSISTANT

## 2024-06-24 PROCEDURE — 82043 UR ALBUMIN QUANTITATIVE: CPT | Performed by: PHYSICIAN ASSISTANT

## 2024-06-24 PROCEDURE — 80061 LIPID PANEL: CPT | Performed by: PHYSICIAN ASSISTANT

## 2024-06-24 PROCEDURE — 1126F AMNT PAIN NOTED NONE PRSNT: CPT | Performed by: PHYSICIAN ASSISTANT

## 2024-06-24 PROCEDURE — 82306 VITAMIN D 25 HYDROXY: CPT | Performed by: PHYSICIAN ASSISTANT

## 2024-06-24 PROCEDURE — 3074F SYST BP LT 130 MM HG: CPT | Performed by: PHYSICIAN ASSISTANT

## 2024-06-24 PROCEDURE — 99203 OFFICE O/P NEW LOW 30 MIN: CPT | Performed by: PHYSICAL MEDICINE & REHABILITATION

## 2024-06-24 PROCEDURE — 93000 ELECTROCARDIOGRAM COMPLETE: CPT | Performed by: PHYSICIAN ASSISTANT

## 2024-06-24 PROCEDURE — 84450 TRANSFERASE (AST) (SGOT): CPT | Performed by: PHYSICIAN ASSISTANT

## 2024-06-24 PROCEDURE — 83036 HEMOGLOBIN GLYCOSYLATED A1C: CPT | Performed by: PHYSICIAN ASSISTANT

## 2024-06-24 PROCEDURE — 80048 BASIC METABOLIC PNL TOTAL CA: CPT | Performed by: PHYSICIAN ASSISTANT

## 2024-06-24 RX ORDER — DOXYCYCLINE 100 MG/1
100 CAPSULE ORAL 2 TIMES DAILY
Qty: 28 CAPSULE | Refills: 0 | Status: SHIPPED | OUTPATIENT
Start: 2024-06-24 | End: 2024-07-08

## 2024-06-24 ASSESSMENT — ACTIVITIES OF DAILY LIVING (ADL)
BATHING: INDEPENDENT
JUDGMENT_ADEQUATE_SAFELY_COMPLETE_DAILY_ACTIVITIES: YES
MANAGING FINANCES: INDEPENDENT
ADEQUATE_TO_COMPLETE_ADL: YES
PILL BOX USED: YES
FEEDING YOURSELF: INDEPENDENT
NEEDS ASSISTANCE WITH FOOD: INDEPENDENT
DOING HOUSEWORK: INDEPENDENT
FEEDING: INDEPENDENT
HEARING - LEFT EAR: FUNCTIONAL
ADEQUATE_TO_COMPLETE_ADL: YES
WALKS IN HOME: INDEPENDENT
GROOMING: INDEPENDENT
TOILETING: INDEPENDENT
ASSISTIVE_DEVICE: EYEGLASSES
PATIENT'S MEMORY ADEQUATE TO SAFELY COMPLETE DAILY ACTIVITIES?: YES
JUDGMENT_ADEQUATE_SAFELY_COMPLETE_DAILY_ACTIVITIES: YES
DRESSING YOURSELF: INDEPENDENT
STIL DRIVING: YES
EATING: INDEPENDENT
USING TELEPHONE: INDEPENDENT
USING TRANSPORTATION: INDEPENDENT
TAKING MEDICATION: INDEPENDENT
DRESSING: INDEPENDENT
BATHING: INDEPENDENT
HEARING - RIGHT EAR: FUNCTIONAL
PREPARING MEALS: INDEPENDENT
GROCERY SHOPPING: INDEPENDENT
TOILETING: INDEPENDENT

## 2024-06-24 ASSESSMENT — ENCOUNTER SYMPTOMS
OCCASIONAL FEELINGS OF UNSTEADINESS: 0
FATIGUE: 0
LOSS OF SENSATION IN FEET: 0
PALPITATIONS: 0
DIFFICULTY URINATING: 0
CONFUSION: 0
WEAKNESS: 0
ABDOMINAL PAIN: 0
POLYPHAGIA: 0
SORE THROAT: 0
MYALGIAS: 0
NERVOUS/ANXIOUS: 0
HEMATURIA: 0
DEPRESSION: 0
NUMBNESS: 0
APPETITE CHANGE: 0
HEADACHES: 0
SLEEP DISTURBANCE: 0
WHEEZING: 0
NAUSEA: 0
DIZZINESS: 0
SHORTNESS OF BREATH: 0
EYE DISCHARGE: 0
DIARRHEA: 0
ANAL BLEEDING: 0
COUGH: 0
COLOR CHANGE: 0
ARTHRALGIAS: 0
TREMORS: 0
FEVER: 0
POLYDIPSIA: 0
CONSTIPATION: 0
JOINT SWELLING: 0
CHILLS: 0
FACIAL SWELLING: 0
CHOKING: 0
FREQUENCY: 0
VOMITING: 0
ABDOMINAL DISTENTION: 0
EYE PAIN: 0
CHEST TIGHTNESS: 0

## 2024-06-24 ASSESSMENT — GERIATRIC MINI NUTRITIONAL ASSESSMENT (MNA)
B WEIGHT LOSS DURING THE LAST 3 MONTHS: NO WEIGHT LOSS
C GENERAL MOBILITY: GOES OUT
E NEUROPSYCHOLOGICAL PROBLEMS: NO PSYCHOLOGICAL PROBLEMS
D HAS SUFFERED PSYCHOLOGICAL STRESS OR ACUTE DISEASE IN THE PAST 3 MONTHS?: NO
A HAS FOOD INTAKE DECLINED OVER THE PAST 3 MONTHS DUE TO LOSS OF APPETITE, DIGESTIVE PROBLEMS, CHEWING OR SWALLOWING DIFFICULTIES?: NO DECREASE IN FOOD INTAKE

## 2024-06-24 ASSESSMENT — PAIN SCALES - GENERAL
PAINLEVEL: 0-NO PAIN
PAINLEVEL: 0-NO PAIN

## 2024-06-24 NOTE — PROGRESS NOTES
"Subjective   Reason for Visit: Kentrell Munguia is an 68 y.o. male here for a Medicare Wellness visit.     Past Medical, Surgical, and Family History reviewed and updated in chart.    Reviewed all medications by prescribing practitioner or clinical pharmacist (such as prescriptions, OTCs, herbal therapies and supplements) and documented in the medical record.    HPI    Patient Care Team:  Odalis Avila PA-C as PCP - General (Internal Medicine)  Odalis Avila PA-C as PCP [facility]     Review of Systems    Objective   Vitals:  /62   Ht 1.74 m (5' 8.5\")   Wt 103 kg (226 lb)   BMI 33.86 kg/m²       Physical Exam    Assessment/Plan   Problem List Items Addressed This Visit       Smoking addiction    Prostate cancer (Multi)    Relevant Orders    Prostate Specific Antigen    Prediabetes    Relevant Orders    Hemoglobin A1C    POCT ALBUMIN RANDOM URINE DOCKED DEVICE    DAVID (obstructive sleep apnea)    Mild vitamin D deficiency    Relevant Orders    Vitamin D 25-Hydroxy,Total (for eval of Vitamin D levels)    Interstitial lung disease (Multi)    Encounter for subsequent annual wellness visit (AWV) in Medicare patient    Dyslipidemia    Relevant Orders    Aspartate Aminotransferase    Alanine Aminotransferase    Lipid Panel    Dependence on nicotine from cigarettes    Bone lesion    Relevant Orders    XR hip left with pelvis when performed 2 or 3 views    Benign essential hypertension    Relevant Orders    Basic Metabolic Panel    CBC    POCT ALBUMIN RANDOM URINE DOCKED DEVICE    ECG 12 Lead     Other Visit Diagnoses       Routine general medical examination at health care facility    -  Primary    Relevant Orders    1 Year Follow Up In Primary Care - Wellness Exam    Encounter for screening for malignant neoplasm of colon        Relevant Orders    Colonoscopy Screening; Average Risk Patient    Right hip pain        Relevant Orders    XR hip right with pelvis when performed 2 or 3 views "    Referral to Orthopaedic Surgery    Cutaneous abscess of back excluding buttocks        Relevant Medications    doxycycline (Vibramycin) 100 mg capsule

## 2024-06-24 NOTE — PROGRESS NOTES
Subjective   Patient ID: Kentrell Munguia is a 68 y.o. male with a history of hypertension, DAVID, pulmonary nodules, current cigarette smoker, dyslipidemia, and prostate cancer who presents for Medicare Annual Wellness Visit Initial    HPI the patient is presented for follow-up  Hypertension-blood pressure is well-controlled which is in between 100-110 mmHg.  He is off of amlodipine and continues taking doxazosin and losartan-HCTZ.  He denies shortness of breath or chest pain.    Smoking-quit 6 months.  No cravings.    Prediabetes-not on a diet.    Right hip pain-has been having for a year.  It occasionally locks while walking.  Denies any trauma.    Skin abscess of upper left back-noticed a week ago. There is redness and pain but no drainage.  He denies fever or chills.    Review of Systems   Constitutional:  Negative for appetite change, chills, fatigue and fever.   HENT:  Negative for congestion, ear pain, facial swelling, hearing loss, nosebleeds and sore throat.    Eyes:  Negative for pain, discharge and visual disturbance.   Respiratory:  Negative for cough, choking, chest tightness, shortness of breath and wheezing.    Cardiovascular:  Negative for chest pain, palpitations and leg swelling.   Gastrointestinal:  Negative for abdominal distention, abdominal pain, anal bleeding, constipation, diarrhea, nausea and vomiting.   Endocrine: Negative for cold intolerance, heat intolerance, polydipsia, polyphagia and polyuria.   Genitourinary:  Negative for difficulty urinating, frequency, hematuria and urgency.   Musculoskeletal:  Negative for arthralgias, gait problem, joint swelling and myalgias.        Right hip pain   Skin:  Negative for color change and rash.        Skin abscess of right mid back   Neurological:  Negative for dizziness, tremors, syncope, weakness, numbness and headaches.   Psychiatric/Behavioral:  Negative for behavioral problems, confusion, sleep disturbance and suicidal ideas. The patient is not  "nervous/anxious.        Objective   /62   Ht 1.74 m (5' 8.5\")   Wt 103 kg (226 lb)   BMI 33.86 kg/m²     Physical Exam  Constitutional:       General: He is not in acute distress.     Appearance: Normal appearance.   HENT:      Head: Normocephalic and atraumatic.      Nose: Nose normal.   Eyes:      Extraocular Movements: Extraocular movements intact.      Conjunctiva/sclera: Conjunctivae normal.      Pupils: Pupils are equal, round, and reactive to light.   Cardiovascular:      Rate and Rhythm: Normal rate and regular rhythm.      Pulses: Normal pulses.      Heart sounds: Normal heart sounds.      Comments: Varicose veins of lower extremities b/l  Pulmonary:      Effort: Pulmonary effort is normal.      Breath sounds: Normal breath sounds.   Abdominal:      General: Bowel sounds are normal.      Palpations: Abdomen is soft.   Musculoskeletal:         General: Normal range of motion.      Cervical back: Normal range of motion and neck supple.   Skin:     Comments: Large skin abscess on right mid back under scapula   Neurological:      General: No focal deficit present.      Mental Status: He is alert and oriented to person, place, and time.   Psychiatric:         Mood and Affect: Mood normal.         Behavior: Behavior normal.         Thought Content: Thought content normal.         Judgment: Judgment normal.         Assessment/Plan   Skin abscess of mid back  Start doxycycline 100 mg twice daily for 10 days  Continue to apply warm compress    Right hip pain  Will obtain x-ray hip, requisition is given  Take Tylenol Extra Strength as needed for pain  Consult orthopedic surgery, referral was given    High blood pressure   Blood pressure is well controlled  Off of amlodipine  Continue doxazosin 4 mg at bedtime  Continue losartan-HCTZ 100-12.5 mg daily  No added salt diet, do not add salt to your food   Try to exercise every day for 30 minutes  EKG is obtained today  Sinus bradycardia  Otherwise normal " ECG    Prediabetes  Last hemoglobin A1c 5.7  low-carb diet recommended  Try to exercise 30 minutes daily     Benign bone lesion of left hip  continue surveillance  X-ray hip requisition is given  Follow up with orthopedic oncology Dr. Hillman, last seen on 7/6/2023     Smoking addiction  Quit smoking in January 2024  CT chest low-dose 11/20/2023, stable pulmonary nodules  ultrasound abdominal aorta normal     Pulmonary nodules  stable  CT chest low-dose 11/20/2023, stable pulmonary nodules  Follow up with pulmonology     Obstructive sleep apnea  APAP recommended, declined     Prostate cancer  stable  Off of Tamsulosin 0.4 mg daily  Follow up with urology as scheduled     CAD  Continue Rosuvastatin 20 mg at bed time  Continue Aspirin 81 mg daily  Try to lose weight  Exercise at least 30 minutes daily  Low-fat diet recommended  Stress test in 2020 normal  Follow-up with cardiology Dr. Acevedo      Hyperlipidemia  Continue Rosuvastatin 20 mg at bed time  low fat, low cholesterol diet  I recommend Mediterranean diet, which include fish, chicken, vegetables and olive oil  Exercise daily for 30 minutes daily or at least 3 times a week     Elevated hematocrit and red blood cells  Most likely due to smoking  Quit smoking January 2024  Will obtain CBC today     Multiple colon polyps  Colonoscopy done in May 2021, repeat colonoscopy in 3 years, new order is placed    Body mass index is 33.86 kg/m².  Try to have as many home cooked meals as possible  Avoid processed foods which contain excess calories, sugar, and sodium.  Exercise 30 minutes daily    Health maintenance  Colonoscopy May 2021, repeat colonoscopy in 3 years, new order is placed  Tdap is given today  Prevnar 13 done in 2021, Prevnar 20 recommended  Shingrix recommended, have it done at the pharmacy  See your dentist twice a year  Yearly eye exam  see dermatology once a year for a skin check.  Continue multivitamins    We obtained fasting blood work and urine  albumin today  I will call with results    Follow-up in 3 months

## 2024-06-24 NOTE — PROGRESS NOTES
Ref by PCP for right hip pain.  Qright hip will give out while walking from time to time and has caused patient to fall x1.  No know injury.  Xray from this morning on both hips.

## 2024-06-24 NOTE — PROGRESS NOTES
Physical Medicine and Rehabilitation MSK Consult  06/24/24       Chief Complaint:  Low back pain     HPI:  Kentrell Munguia is a  68 y.o. M who presents to the clinic today  for evaluation of R hip pain.  Onset: 1.5 years ago no trauma  When sitting for a while and then sometimes gives out  Feels anterior thigh pain and sometimes can't put any weight on it  Feels like something is tweaked above the groin  Also pain in the right lower back  Sometimes twisting  Location: as above  Radiation: as above  Quality: feels sharp  Duration: comes and goes  Aggravating factors:  pressure and walking, getting in and out of the car  Alleviating factors: sitting and sleeping  Severity: 0  Numbness/Tingling: No  Bowel or bladder incontinence: No  Pt's current medication regimen includes:      Treatment to date:  Physical therapy: No  Medications taken to date for this complaint include the following:   none  Prior injections: No       No falls, but close calls.      Imaging  Reviewed 06/24/24    Xr pelvis 7/2023  TECHNIQUE:  AP pelvis and  AP and frogleg lateral  Left hip radiographs.     FINDINGS:  No fracture or dislocation is evident.There is moderate right and  mild-to-moderate left hip degenerative change. There is subtle  appreciation of mottled increased density in the inter trochanteric  left femur the correlates with the findings seen on the prior CT.      Impression   1. Subtle appreciation structure in the intertrochanteric left femur  with similar differential considerations as discussed on the prior  CT. Follow-up can be considered as clinically warranted.  2. Degenerative change as above.       No past medical history on file.     Past Surgical History:   Procedure Laterality Date    OTHER SURGICAL HISTORY  07/07/2020    Vasectomy        Patient Active Problem List    Diagnosis Date Noted    Nocturia 06/24/2024    Right hip pain 06/24/2024    Cutaneous abscess of back excluding buttocks 06/24/2024    Tubular adenoma  of colon 12/18/2023    Smoking addiction 12/18/2023    Benign essential hypertension 06/16/2023    Chronic pain of both shoulders 06/16/2023    Dependence on nicotine from cigarettes 06/16/2023    Dyslipidemia 06/16/2023    Interstitial lung disease (Multi) 06/16/2023    Mild vitamin D deficiency 06/16/2023    Multiple pulmonary nodules 06/16/2023    DAVID (obstructive sleep apnea) 06/16/2023    Prostate cancer (Multi) 06/16/2023    Tubulovillous adenoma of colon 06/16/2023    Coronary artery disease due to calcified coronary lesion 06/16/2023    Encounter for screening for malignant neoplasm of colon 06/16/2023    Bone lesion 06/16/2023    Prediabetes 06/16/2023    BMI 33.0-33.9,adult 06/16/2023        Family History   Problem Relation Name Age of Onset    Heart attack Mother          Current Outpatient Medications   Medication Sig Dispense Refill    aspirin 81 mg EC tablet Take 1 tablet (81 mg) by mouth once daily.      doxazosin (Cardura) 4 mg tablet Take 1 tablet (4 mg) by mouth once daily at bedtime. 90 tablet 3    doxycycline (Vibramycin) 100 mg capsule Take 1 capsule (100 mg) by mouth 2 times a day for 14 days. Take with at least 8 ounces (large glass) of water, do not lie down for 30 minutes after 28 capsule 0    losartan-hydrochlorothiazide (Hyzaar) 100-25 mg tablet Take 1 tablet by mouth once daily. 30 tablet 11    rosuvastatin (Crestor) 40 mg tablet TAKE ONE TABLET BY MOUTH ONCE DAILY 90 tablet 3     No current facility-administered medications for this visit.        No Known Allergies     Social History     Socioeconomic History    Marital status:      Spouse name: Sabina    Number of children: 3    Years of education: None    Highest education level: None   Occupational History    Occupation: retired/ woodshop   Tobacco Use    Smoking status: Former     Average packs/day: 0.5 packs/day for 54.0 years (27.0 ttl pk-yrs)     Types: Cigarettes     Start date: 1970    Smokeless tobacco: Never   Vaping  "Use    Vaping status: Never Used   Substance and Sexual Activity    Alcohol use: Yes     Alcohol/week: 4.0 standard drinks of alcohol     Types: 4 Shots of liquor per week    Drug use: Yes     Types: Marijuana    Sexual activity: Not Currently     Partners: Female   Other Topics Concern    None   Social History Narrative    None     Social Determinants of Health     Financial Resource Strain: Not on file   Food Insecurity: Not on file   Transportation Needs: Not on file   Physical Activity: Not on file   Stress: Not on file   Social Connections: Not on file   Intimate Partner Violence: Not on file   Housing Stability: Not on file   Htn  Hlp  BPH    Retired but works at wood shop  Lives w his wife  Quit smoking 6 months  Htc and 4 drinks a week       Review of Systems:  Constitutional:  Denies fever or chills, malaise, weight changes.   Eyes:  Denies change in visual acuity   HENT:  Denies nasal congestion or sore throat   Respiratory:  Denies cough or shortness of breath   Cardiovascular:  Denies chest pain or edema   GI:  Denies abdominal pain, nausea, vomiting, bloody stools or diarrhea   :  Denies dysuria   Integument:  Denies rash   Neurologic:  As per HPI  MSK: Per above HPI  Endocrine:  Denies polyuria or polydipsia   Lymphatic:  Denies swollen glands   Psychiatric:  Denies depression or anxiety            PHYSICAL EXAM:  /76 (BP Location: Right arm, Patient Position: Sitting)   Pulse 70   Temp 36.5 °C (97.7 °F)   Ht 1.772 m (5' 9.75\")   Wt 106 kg (233 lb)   BMI 33.67 kg/m²     General:  NAD, well developed, M      Psychiatric: appropriate mood & affect.   Cardiovascular:  Normal pedal pulses; no LE edema.  Respiratory:  Normal rate; unlabored breathing.  Skin:  Intact; no erythema; no ecchymosis or rash.  Lymphatic:  No lymphadenopathy or lymphedema.  NEURO:  Alert and appropriate. Speech fluent, conversing appropriately.   Motor:    Rt: HF 5/5, KE 5/5, KF 5/5, DF 5/5, EHL 5/5, PF 5/5.    Lt: HF " 5/5, KE 5/5, KF 5/5, DF 5/5, EHL 5/5, PF 5/5.  Sensation:     Light touch: intact in the b/l L3-S1 dermatomes.    PP: intact in the b/l L3-S1 dermatomes  Reflexes:     Right:  patellar 2+, achilles 2+,     Left: patellar 2+, achilles 2+,     Babinski's downgoing b/l; no clonus  Gait: Normal, narrow based gait.     MSK:  Inspection reveals no evidence of a pelvic obliqity   Spinal range of motion: Flexion to 60° degrees, Extension of 10 degrees.  There is tenderness over the R anterior thigh and R lateral thigh.   Special tests:    Straight leg raise: R lumbar ttp    Slump test:    Facet loading: + R   Pain w internal rotation mild  Slightly antalgic gait            Impression: Kentrell Munguia is a 68 y.o. M w pmh of htn, hlp presenting with R groin pain and R lower back due to R hip arthritis and likely spondylosis   Plan:    Orders Placed This Encounter   Procedures    XR lumbar spine complete 4+ views    Referral to Physical Therapy    1, xr l spine  2. Pt for R hip and L spine  3. Not interested in meds  4. Can consider referral for diagnostic and therapeutic steroid injections R hip     Thank you for the consultation.     Jyoti Ocasio MD  Physical Medicine and Rehabilitation

## 2024-06-25 ENCOUNTER — TELEPHONE (OUTPATIENT)
Dept: PRIMARY CARE | Facility: CLINIC | Age: 69
End: 2024-06-25
Payer: MEDICARE

## 2024-06-25 NOTE — TELEPHONE ENCOUNTER
----- Message from Odalis Avila PA-C sent at 6/25/2024  4:52 PM EDT -----  Please call him with x-ray results.  He has moderate arthritis in his both hips right worse than left.

## 2024-06-29 ENCOUNTER — HOSPITAL ENCOUNTER (OUTPATIENT)
Dept: RADIOLOGY | Facility: HOSPITAL | Age: 69
Discharge: HOME | End: 2024-06-29
Payer: MEDICARE

## 2024-06-29 DIAGNOSIS — M47.816 LUMBAR SPONDYLOSIS: ICD-10-CM

## 2024-06-29 DIAGNOSIS — M76.891 TENDONITIS OF RIGHT HIP: ICD-10-CM

## 2024-06-29 DIAGNOSIS — M16.11 ARTHRITIS OF RIGHT HIP: ICD-10-CM

## 2024-06-29 DIAGNOSIS — M25.551 RIGHT HIP PAIN: ICD-10-CM

## 2024-06-29 PROCEDURE — 72110 X-RAY EXAM L-2 SPINE 4/>VWS: CPT | Performed by: RADIOLOGY

## 2024-06-29 PROCEDURE — 72110 X-RAY EXAM L-2 SPINE 4/>VWS: CPT

## 2024-07-01 ENCOUNTER — APPOINTMENT (OUTPATIENT)
Dept: PHYSICAL MEDICINE AND REHAB | Facility: CLINIC | Age: 69
End: 2024-07-01
Payer: MEDICARE

## 2024-07-09 ENCOUNTER — OFFICE VISIT (OUTPATIENT)
Dept: PRIMARY CARE | Facility: CLINIC | Age: 69
End: 2024-07-09
Payer: MEDICARE

## 2024-07-09 VITALS — TEMPERATURE: 96.9 F | WEIGHT: 233 LBS | HEIGHT: 69 IN | BODY MASS INDEX: 34.51 KG/M2

## 2024-07-09 DIAGNOSIS — L02.212 ABSCESS OF BACK: Primary | ICD-10-CM

## 2024-07-09 PROBLEM — N18.31 STAGE 3A CHRONIC KIDNEY DISEASE (MULTI): Status: ACTIVE | Noted: 2024-07-09

## 2024-07-09 PROCEDURE — 99213 OFFICE O/P EST LOW 20 MIN: CPT | Performed by: PHYSICIAN ASSISTANT

## 2024-07-09 PROCEDURE — 1159F MED LIST DOCD IN RCRD: CPT | Performed by: PHYSICIAN ASSISTANT

## 2024-07-09 PROCEDURE — 4004F PT TOBACCO SCREEN RCVD TLK: CPT | Performed by: PHYSICIAN ASSISTANT

## 2024-07-09 PROCEDURE — 1126F AMNT PAIN NOTED NONE PRSNT: CPT | Performed by: PHYSICIAN ASSISTANT

## 2024-07-09 PROCEDURE — 1160F RVW MEDS BY RX/DR IN RCRD: CPT | Performed by: PHYSICIAN ASSISTANT

## 2024-07-09 PROCEDURE — 1124F ACP DISCUSS-NO DSCNMKR DOCD: CPT | Performed by: PHYSICIAN ASSISTANT

## 2024-07-09 RX ORDER — SULFAMETHOXAZOLE AND TRIMETHOPRIM 800; 160 MG/1; MG/1
1 TABLET ORAL 2 TIMES DAILY
Qty: 20 TABLET | Refills: 0 | Status: SHIPPED | OUTPATIENT
Start: 2024-07-09 | End: 2024-07-19

## 2024-07-09 ASSESSMENT — ENCOUNTER SYMPTOMS
WHEEZING: 0
VOMITING: 0
COUGH: 0
NUMBNESS: 0
PALPITATIONS: 0
SORE THROAT: 0
FREQUENCY: 0
DIZZINESS: 0
SHORTNESS OF BREATH: 0
ARTHRALGIAS: 0
SLEEP DISTURBANCE: 0
CHILLS: 0
DIARRHEA: 0
DEPRESSION: 0
APPETITE CHANGE: 0
FATIGUE: 0
MYALGIAS: 0
NERVOUS/ANXIOUS: 0
FACIAL SWELLING: 0
DIFFICULTY URINATING: 0
CONSTIPATION: 0
NAUSEA: 0
EYE DISCHARGE: 0
CHEST TIGHTNESS: 0
EYE PAIN: 0
LOSS OF SENSATION IN FEET: 0
HEADACHES: 0
CONFUSION: 0
JOINT SWELLING: 0
FEVER: 0
WEAKNESS: 0
COLOR CHANGE: 0
POLYDIPSIA: 0
TREMORS: 0
HEMATURIA: 0
ANAL BLEEDING: 0
ABDOMINAL PAIN: 0
OCCASIONAL FEELINGS OF UNSTEADINESS: 0
CHOKING: 0
ABDOMINAL DISTENTION: 0
POLYPHAGIA: 0

## 2024-07-09 ASSESSMENT — COLUMBIA-SUICIDE SEVERITY RATING SCALE - C-SSRS
2. HAVE YOU ACTUALLY HAD ANY THOUGHTS OF KILLING YOURSELF?: NO
6. HAVE YOU EVER DONE ANYTHING, STARTED TO DO ANYTHING, OR PREPARED TO DO ANYTHING TO END YOUR LIFE?: NO
1. IN THE PAST MONTH, HAVE YOU WISHED YOU WERE DEAD OR WISHED YOU COULD GO TO SLEEP AND NOT WAKE UP?: NO

## 2024-07-09 ASSESSMENT — PATIENT HEALTH QUESTIONNAIRE - PHQ9
SUM OF ALL RESPONSES TO PHQ9 QUESTIONS 1 AND 2: 0
1. LITTLE INTEREST OR PLEASURE IN DOING THINGS: NOT AT ALL
2. FEELING DOWN, DEPRESSED OR HOPELESS: NOT AT ALL

## 2024-07-09 ASSESSMENT — PAIN SCALES - GENERAL: PAINLEVEL: 0-NO PAIN

## 2024-07-09 NOTE — PROGRESS NOTES
"Subjective   Patient ID: Kentrell Munguia is a 68 y.o. male with a history of CAD, hypertension, DAVID, pulmonary nodules, former cigarette smoker, CKD, hyperlipidemia, prediabetes, prostate cancer and colon polyps who presents for Mass (Left Upper Back)    HPI the patient is presented for skin abscess follow-up.  He finished doxycycline and per patient it looks a little better.  It is not as red and painful but the lump had not gotten smaller.  He denies fever or chills.    Review of Systems   Constitutional:  Negative for appetite change, chills, fatigue and fever.   HENT:  Negative for congestion, ear pain, facial swelling, hearing loss, nosebleeds and sore throat.    Eyes:  Negative for pain, discharge and visual disturbance.   Respiratory:  Negative for cough, choking, chest tightness, shortness of breath and wheezing.    Cardiovascular:  Negative for chest pain, palpitations and leg swelling.   Gastrointestinal:  Negative for abdominal distention, abdominal pain, anal bleeding, constipation, diarrhea, nausea and vomiting.   Endocrine: Negative for cold intolerance, heat intolerance, polydipsia, polyphagia and polyuria.   Genitourinary:  Negative for difficulty urinating, frequency, hematuria and urgency.   Musculoskeletal:  Negative for arthralgias, gait problem, joint swelling and myalgias.        Right hip pain   Skin:  Negative for color change and rash.        Skin abscess of right mid back   Neurological:  Negative for dizziness, tremors, syncope, weakness, numbness and headaches.   Psychiatric/Behavioral:  Negative for behavioral problems, confusion, sleep disturbance and suicidal ideas. The patient is not nervous/anxious.        Objective   Temp 36.1 °C (96.9 °F) (Temporal)   Ht 1.753 m (5' 9\")   Wt 106 kg (233 lb)   BMI 34.41 kg/m²     Physical Exam  Constitutional:       General: He is not in acute distress.     Appearance: Normal appearance.   HENT:      Head: Normocephalic and atraumatic.      " Nose: Nose normal.   Eyes:      Extraocular Movements: Extraocular movements intact.      Conjunctiva/sclera: Conjunctivae normal.      Pupils: Pupils are equal, round, and reactive to light.   Cardiovascular:      Rate and Rhythm: Normal rate and regular rhythm.      Pulses: Normal pulses.      Heart sounds: Normal heart sounds.      Comments: Varicose veins of lower extremities b/l  Pulmonary:      Effort: Pulmonary effort is normal.      Breath sounds: Normal breath sounds.   Abdominal:      General: Bowel sounds are normal.      Palpations: Abdomen is soft.   Musculoskeletal:         General: Normal range of motion.      Cervical back: Normal range of motion and neck supple.   Skin:     Comments: skin abscess on right mid back under scapula   Neurological:      General: No focal deficit present.      Mental Status: He is alert and oriented to person, place, and time.   Psychiatric:         Mood and Affect: Mood normal.         Behavior: Behavior normal.         Thought Content: Thought content normal.         Judgment: Judgment normal.         Assessment/Plan   Skin abscess of mid back  Somewhat improved with doxycycline  Start Bactrim DS twice daily for 10 days  Continue to apply warm compress  Consult general surgery, referral was given    Right hip pain  X-ray hand demonstrated unchanged moderate arthritic changes with joint space narrowing and spurring  Take Tylenol Extra Strength as needed for pain  Consult orthopedic surgery, referral was given    Hypertension.  Blood pressure is well controlled  Off of amlodipine  Continue doxazosin 4 mg at bedtime  Continue losartan-HCTZ 100-12.5 mg daily  No added salt diet, do not add salt to your food   Try to exercise every day for 30 minutes    Prediabetes  Last hemoglobin A1c 6.1%  low-carb diet recommended  Try to exercise 30 minutes daily    CKD  Avoid nephrotoxic agents such as NSAIDs  Drink at least 32 ounces of water daily  Will keep monitoring    Benign bone  lesion of left hip  continue surveillance  X-ray hip requisition is given  Follow up with orthopedic oncology Dr. Hillman, last seen on 7/6/2023     Smoking addiction  Quit smoking in January 2024  CT chest low-dose 11/20/2023, stable pulmonary nodules  ultrasound abdominal aorta normal     Pulmonary nodules  stable  CT chest low-dose 11/20/2023, stable pulmonary nodules  Follow up with pulmonology     Obstructive sleep apnea  APAP recommended, declined     Prostate cancer  PSA 14.18  Off of Tamsulosin 0.4 mg   Follow up with urology, scheduled on 7/29/2024    CAD  Continue Rosuvastatin 20 mg at bed time  Continue Aspirin 81 mg daily  Try to lose weight  Exercise at least 30 minutes daily  Low-fat diet recommended  Stress test in 2020 normal  Follow-up with cardiology Dr. Acevedo      Hyperlipidemia  Continue Rosuvastatin 20 mg at bed time  low fat, low cholesterol diet  I recommend Mediterranean diet, which include fish, chicken, vegetables and olive oil  Exercise daily for 30 minutes daily or at least 3 times a week     Elevated hematocrit and red blood cells  Most likely due to smoking  Quit smoking January 2024  Improved     Multiple colon polyps  Colonoscopy done in May 2021, repeat colonoscopy in 3 years, new order is placed    Body mass index is 34.41 kg/m².  Try to have as many home cooked meals as possible  Avoid processed foods which contain excess calories, sugar, and sodium.  Exercise 30 minutes daily    Health maintenance  Colonoscopy May 2021, repeat colonoscopy in 3 years, new order is placed  Tdap up-to-date  Prevnar 13 done in 2021, Prevnar 20 recommended  Shingrix recommended, have it done at the pharmacy  See dentist twice a year  Yearly eye exam  see dermatology once a year for a skin check.  Continue multivitamins    Follow-up in 3 months

## 2024-07-22 ENCOUNTER — APPOINTMENT (OUTPATIENT)
Dept: SURGERY | Facility: CLINIC | Age: 69
End: 2024-07-22
Payer: MEDICARE

## 2024-07-22 VITALS
OXYGEN SATURATION: 95 % | SYSTOLIC BLOOD PRESSURE: 99 MMHG | RESPIRATION RATE: 16 BRPM | WEIGHT: 236 LBS | BODY MASS INDEX: 35.77 KG/M2 | TEMPERATURE: 98.4 F | DIASTOLIC BLOOD PRESSURE: 65 MMHG | HEIGHT: 68 IN | HEART RATE: 64 BPM

## 2024-07-22 DIAGNOSIS — L02.212 ABSCESS OF BACK: ICD-10-CM

## 2024-07-22 PROCEDURE — 3008F BODY MASS INDEX DOCD: CPT | Performed by: SURGERY

## 2024-07-22 PROCEDURE — 1159F MED LIST DOCD IN RCRD: CPT | Performed by: SURGERY

## 2024-07-22 PROCEDURE — 3074F SYST BP LT 130 MM HG: CPT | Performed by: SURGERY

## 2024-07-22 PROCEDURE — 99204 OFFICE O/P NEW MOD 45 MIN: CPT | Performed by: SURGERY

## 2024-07-22 PROCEDURE — 3078F DIAST BP <80 MM HG: CPT | Performed by: SURGERY

## 2024-07-22 NOTE — PROGRESS NOTES
Subjective   Patient ID: Kentrell Munguia is a 68 y.o. male who presents for Abscess (NPV Abscess of Back).  HPI  This is a pleasant gentleman who has a history of abscesses most likely related to infected sebaceous cyst on his back in the past and is here today after receiving oral antibiotics for yet another infected sebaceous cyst on his back.    He does have a history of heart disease hypertension hyperlipidemia type 2 diabetes    He does not smoke cigarettes he says he quit but he does smoke marijuana he rarely drinks alcohol.  He works as a garcia.  Review of Systems  10 point review is otherwise negative  Objective   Physical Exam on exam the area of the back reveals some fluctuant area and there is definitely a sebaceous cyst even has some other small ones that are noted on the back he has a lipoma up around his neck.  Lungs are clear and heart is regular rate and rhythm.    Under sterile conditions the area was prepped and draped in the usual sterile manner it was anesthetized with 1% lidocaine with epinephrine and his incision was made here through this thick skin down to the cyst and immediately purulent material was extruded along with some sebaceous material.  The wound was then packed with a long strip of packing gauze and covered.    Assessment/Plan I recommend he return in 2 weeks he was given instructions on having this dressing changed daily at home and can follow-up with me in 2 weeks at that time we will decide if it is beginning to shrink some when we might be able to do an outpatient procedure to remove the entire cyst.           Aggie Bonilla MD 07/22/24 4:34 PM

## 2024-07-29 ENCOUNTER — APPOINTMENT (OUTPATIENT)
Dept: UROLOGY | Facility: CLINIC | Age: 69
End: 2024-07-29
Payer: MEDICARE

## 2024-07-29 VITALS — TEMPERATURE: 98 F

## 2024-07-29 DIAGNOSIS — C61 PROSTATE CANCER (MULTI): Primary | ICD-10-CM

## 2024-07-29 DIAGNOSIS — R31.9 HEMATURIA, UNSPECIFIED TYPE: ICD-10-CM

## 2024-07-29 LAB
POC APPEARANCE, URINE: CLEAR
POC BILIRUBIN, URINE: NEGATIVE
POC BLOOD, URINE: ABNORMAL
POC COLOR, URINE: YELLOW
POC GLUCOSE, URINE: NEGATIVE MG/DL
POC KETONES, URINE: NEGATIVE MG/DL
POC LEUKOCYTES, URINE: NEGATIVE
POC NITRITE,URINE: NEGATIVE
POC PH, URINE: 5 PH
POC PROTEIN, URINE: NEGATIVE MG/DL
POC SPECIFIC GRAVITY, URINE: 1.02
POC UROBILINOGEN, URINE: 0.2 EU/DL

## 2024-07-29 PROCEDURE — 99214 OFFICE O/P EST MOD 30 MIN: CPT | Performed by: UROLOGY

## 2024-07-29 PROCEDURE — 1126F AMNT PAIN NOTED NONE PRSNT: CPT | Performed by: UROLOGY

## 2024-07-29 PROCEDURE — 81001 URINALYSIS AUTO W/SCOPE: CPT

## 2024-07-29 PROCEDURE — 81003 URINALYSIS AUTO W/O SCOPE: CPT | Performed by: UROLOGY

## 2024-07-29 PROCEDURE — 1159F MED LIST DOCD IN RCRD: CPT | Performed by: UROLOGY

## 2024-07-29 PROCEDURE — 51798 US URINE CAPACITY MEASURE: CPT | Performed by: UROLOGY

## 2024-07-29 ASSESSMENT — PAIN SCALES - GENERAL: PAINLEVEL: 0-NO PAIN

## 2024-07-29 NOTE — PROGRESS NOTES
Subjective   Kentrell Munguia is a 68 y.o. male with history of Sarah Ann 6 prostate cancer diagnosed in 2021 and rising PSA, presenting today as a new patient. His PSA is 14.18, this increased from 6.96. He has no bothersome urinary symptoms. He has nocturia x2, which is not bothersome.  Denies any recent gross hematuria, fevers, chills, urinary retention, intractable flank or abdominal pain, nausea or vomiting.            No past medical history on file.  Past Surgical History:   Procedure Laterality Date    OTHER SURGICAL HISTORY  07/07/2020    Vasectomy     Family History   Problem Relation Name Age of Onset    Heart attack Mother       Current Outpatient Medications   Medication Sig Dispense Refill    aspirin 81 mg EC tablet Take 1 tablet (81 mg) by mouth once daily.      doxazosin (Cardura) 4 mg tablet Take 1 tablet (4 mg) by mouth once daily at bedtime. 90 tablet 3    losartan-hydrochlorothiazide (Hyzaar) 100-25 mg tablet Take 1 tablet by mouth once daily. 30 tablet 11    rosuvastatin (Crestor) 40 mg tablet TAKE ONE TABLET BY MOUTH ONCE DAILY 90 tablet 3     No current facility-administered medications for this visit.     No Known Allergies  Social History     Socioeconomic History    Marital status:      Spouse name: Sabina    Number of children: 3    Years of education: Not on file    Highest education level: Not on file   Occupational History    Occupation: retired/ woodshop   Tobacco Use    Smoking status: Former     Average packs/day: 0.5 packs/day for 54.0 years (27.0 ttl pk-yrs)     Types: Cigarettes     Start date: 1970    Smokeless tobacco: Never   Vaping Use    Vaping status: Never Used   Substance and Sexual Activity    Alcohol use: Yes     Alcohol/week: 4.0 standard drinks of alcohol     Types: 4 Shots of liquor per week    Drug use: Yes     Types: Marijuana    Sexual activity: Not Currently     Partners: Female   Other Topics Concern    Not on file   Social History Narrative    Not on file      Social Determinants of Health     Financial Resource Strain: Not on file   Food Insecurity: Not on file   Transportation Needs: Not on file   Physical Activity: Not on file   Stress: Not on file   Social Connections: Not on file   Intimate Partner Violence: Not on file   Housing Stability: Not on file       Review of Systems  Pertinent items are noted in HPI.    Objective       Lab Review  Lab Results   Component Value Date    WBC 5.0 09/18/2020    RBC 5.39 09/18/2020    HGB 16.7 09/18/2020    HCT 51.4 09/18/2020     09/18/2020      Lab Results   Component Value Date    BUN 19 07/07/2023    CREATININE 0.98 07/07/2023      Lab Results   Component Value Date    PSA 6.8 (H) 11/15/2021   PVR=14ml   Urine analysis shows +blood      Assessment/Plan   Diagnoses and all orders for this visit:  Prostate cancer (Multi)  -     Measure post void residual  -     POCT UA Automated manually resulted  -     MR prostate with renetta boundaries if pirads 3 or above; Future  Hematuria, unspecified type  -     Microscopic Only, Urine; Future      History of Bernadette 6 prostate cancer   History of elevated PSA     We will repeat prostate MRI.    Patient will follow up with Dr. Bennett.     Hematuria     IO UA shows moderate blood.     We will check microscopic UA follow up with Dr. Bennett to see if any further workup is needed.     4. Nocturia, not bothersome    Will continue to monitor.     All questions were answered to the patient's satisfaction. Patient agrees with the plan and wishes to proceed. Follow-up will be scheduled appropriately.     E&M visit today is associated with current or anticipated ongoing medical care services related to a patient's single, serious condition or a complex condition.    Scribed for Dr. Fuchs by Anita Perez. I , Dr Fuchs, have personally reviewed and agreed with the information entered by the Virtual Scribe.

## 2024-07-30 LAB
MUCOUS THREADS #/AREA URNS AUTO: NORMAL /LPF
RBC #/AREA URNS AUTO: NORMAL /HPF
WBC #/AREA URNS AUTO: NORMAL /HPF

## 2024-08-05 ENCOUNTER — EVALUATION (OUTPATIENT)
Dept: PHYSICAL THERAPY | Facility: CLINIC | Age: 69
End: 2024-08-05
Payer: MEDICARE

## 2024-08-05 ENCOUNTER — APPOINTMENT (OUTPATIENT)
Dept: SURGERY | Facility: CLINIC | Age: 69
End: 2024-08-05
Payer: MEDICARE

## 2024-08-05 VITALS
TEMPERATURE: 97.5 F | HEIGHT: 69 IN | HEART RATE: 65 BPM | BODY MASS INDEX: 34.1 KG/M2 | WEIGHT: 230.2 LBS | DIASTOLIC BLOOD PRESSURE: 71 MMHG | SYSTOLIC BLOOD PRESSURE: 105 MMHG | OXYGEN SATURATION: 95 % | RESPIRATION RATE: 16 BRPM

## 2024-08-05 DIAGNOSIS — M25.551 RIGHT HIP PAIN: ICD-10-CM

## 2024-08-05 DIAGNOSIS — M47.816 LUMBAR SPONDYLOSIS: Primary | ICD-10-CM

## 2024-08-05 DIAGNOSIS — M16.11 ARTHRITIS OF RIGHT HIP: ICD-10-CM

## 2024-08-05 DIAGNOSIS — M76.891 TENDONITIS OF RIGHT HIP: ICD-10-CM

## 2024-08-05 DIAGNOSIS — L02.212 ABSCESS OF BACK: Primary | ICD-10-CM

## 2024-08-05 PROCEDURE — 3008F BODY MASS INDEX DOCD: CPT | Performed by: SURGERY

## 2024-08-05 PROCEDURE — 3078F DIAST BP <80 MM HG: CPT | Performed by: SURGERY

## 2024-08-05 PROCEDURE — 99213 OFFICE O/P EST LOW 20 MIN: CPT | Performed by: SURGERY

## 2024-08-05 PROCEDURE — 97161 PT EVAL LOW COMPLEX 20 MIN: CPT | Mod: GP

## 2024-08-05 PROCEDURE — 1159F MED LIST DOCD IN RCRD: CPT | Performed by: SURGERY

## 2024-08-05 PROCEDURE — 3074F SYST BP LT 130 MM HG: CPT | Performed by: SURGERY

## 2024-08-05 PROCEDURE — 97110 THERAPEUTIC EXERCISES: CPT | Mod: GP

## 2024-08-05 ASSESSMENT — ENCOUNTER SYMPTOMS
DEPRESSION: 0
LOSS OF SENSATION IN FEET: 0
OCCASIONAL FEELINGS OF UNSTEADINESS: 0

## 2024-08-05 NOTE — LETTER
August 5, 2024    [place]  [address] [facility] [address]    Patient: Kentrell Munguia   YOB: 1955   Date of Visit: 8/5/2024       Dear Jyoti Ocasio [place]  [address] [place] [address]    The attached plan of care is being sent to you because your patient’s medical reimbursement requires that you certify the plan of care. Your signature is required to allow uninterrupted insurance coverage.      You may indicate your approval by signing below and faxing this form back to us at Dept Fax: [phone].    Please call Dept: [phone] with any questions or concerns.    Thank you for this referral,        Kourtney Pradhan, PT  [facility] [address] [facility]  [address] [facility] [address]    Payer: Payor: MEDICARE / Plan: MEDICARE PART A AND B / Product Type: *No Product type* /                                                                         Date:     Dear Kourtney Pradhan, PT,     Re: Mr. Kentrell Munguia, MRN:[id number]    I certify that I have reviewed the attached plan of care and it is medically necessary for Mr. Kentrell Munguia (1955) who is under my care.          ______________________________________                    _________________  Provider name and credentials                                           Date and time                                                                                           Plan of Care 8/5/24   Effective from: 8/5/2024  Effective to: 9/30/2024    Plan ID: [id number]            Participants as of Finalize on 8/5/2024    Name Type Comments Contact Info    Jyoti Ocasio MD Referring Provider  [phone]    Kourtney Pradhan, PT Physical Therapist  [phone]       Last Plan Note     Author: Kourtney Pradhan PT Status: Incomplete Last edited: 8/5/2024  8:45 AM       Physical Therapy    Physical Therapy Evaluation and Treatment      Patient Name:  Kentrell Munguia  MRN: [id number]  Today's Date: 8/5/2024    Time Entry:   Time Calculation  Start Time: 0850  Stop Time: 0935  Time Calculation (min): 45 min  PT Evaluation Time Entry  PT Evaluation (Low) Time Entry: 37  PT Therapeutic Procedures Time Entry  Therapeutic Exercise Time Entry: 8    Assessment:  Patient  is a 68 yr old male  presenting to PT Clinic with diagnosis of right hip pain, arthritis and lumbar spondylosis. Patient describes intermittent discomfort in right LB/posterior hip mostly noted  with ascending stairs with RLE.  Clinical examination reveals  possible directional preference for lumbar extension, decreased lumbar AROM, weakness in trunk and DLS, difficulty with ascending steps with RLE.  Resulting in limited participation in pain-free ADLs and inability to perform at their prior level of function.    The patient will benefit from skilled PT services to address above listed impairments/deficits in order to improve functional abilities, decrease pain/discomfort  and return to PLOF and improve QoL.          Plan:  Continue to assess for directional preference in the next few sessions.  Once pain decreased, will progress to include lumbar AROM exercises all planes, core strengthening, LQ stretching, and education in back care and body mechanics for spinal protection.   OP PT Plan  Treatment/Interventions: Cryotherapy, Education/ Instruction, Manual therapy, Taping techniques, Therapeutic activities, Therapeutic exercises  PT Plan: Skilled PT  PT Frequency: 2 times per week  Duration: 4-6 weeks  Rehab Potential: Good  Plan of Care Agreement: Patient    Current Problem:   1. Lumbar spondylosis  Referral to Physical Therapy    Follow Up In Physical Therapy      2. Right hip pain  Referral to Physical Therapy      3. Arthritis of right hip  Referral to Physical Therapy      4. Tendonitis of right hip  Referral to Physical Therapy        General  General  Reason for Referral: Right hip pain M25.551;  Right hip arthritis M16.11; lumbar spondylosis M47.816; right hip tendonitis M76.891  Referred By: Dr. Dion Ocasio  General Comment: 1 (Insurance: MEDICARE A/B, M[place], [place], NO AUTH ( 0$ USED PT/OT))     Precautions  Precautions  STEADI Fall Risk Score (The score of 4 or more indicates an increased risk of falling): 1  Precautions Comment: h/o prostate cancer     Subjective:   Chief Complaint: Patient presents to clinic  with intermittent pain/discomfort in the back of right hip.  States his right hip stops working and the  right leg doesn't want to move forward.  Occasionally greg, happening more frequently. Doesn't normally have back pain. Feels pain in right buttocks when  going up stairs with right LE.  Doesn't really know how to describe symptoms.  Has to drag leg sometimes because it doesn't want to go forward.   Onset Date: ~ 3 yrs  RENU: denies trauma or injury, gradual.     Current Condition:   Worse, more frequently.    Pain:  Location: right LB/LS  and SI area  Description: tightness, discomfort  Aggravating Factors:  pressure on right leg to ascend  steps, twisting to the right, sometimes walking.   Relieving Factors:  rest, time, going easy on it for a little then it gets better.     Functional limitations:  Walking and dragging foot, stairs, difficulty with ADLs when right leg is acting up.     Patient goals:  Find out what is causing the hip problem.   Previous history of back pain over the years. But no surgery or PT.     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     Patients Living Environment: Reviewed and no concern  Lives with wife.     Primary Language: English    Red Flags: Do you have any of the  following? No  Fever/chills, unexplained weight changes, dizziness/fainting, unexplained change in bowel or bladder functions, unexplained malaise or muscle weakness, night pain/sweats, numbness or tingling.     Objective  Gait  Ambulates indep without assistive device with good gait pattern without LOB.  However, mod FH and kypholordotic posture.     Posture:   Mod FH, right shoulder girdle elevated slightly, slight left lateral trunk shift, kypholordotic spine.    Lumbar AROM:   Flexion 75% no pain, decreased reversal of curve  Extension 50% no pain  Rotation R WFL tightness end range  Rotation L  WFL tightness end range  Side Gliding hips R   WFL no pain  Side Gliding hips L WFL no pain    Repeated Test Movements:  Pretest symptoms standing -  Rep Flexion in Standing x 10 reps - no change  Rep Extension in Standing  x 10 reps - no change   Rep SGIS R WFL, no change  Rep SGIS L WFL, no change    Pretest symptoms in lying - no pain or symptoms  Flexion - no pain  Rep Flexion in lying x 10 reps,   Extension - no pain, but decreased ROM   Rep Extension in lying  x 10 reps , no change in pain but slight increase in motion.    Strength:  Hip flexion sitting R    5/5      L  5/5  Hip flexion supine R 4/5        L   4/5  Bridge 100%   Hip Abduction R 4/5       L 4/5  Hip Extension R 3+/5       L 3+/5  Abdominals poor  Dynamic Lumbar Stabilization poor  Knee Extension R   5/5   L 5/5  Knee Flexion R 5/5   L 5/5  Ankle DF  R 5/5     L 5/5  Ankle PF R 5/5    L 5/5    Neurological:   Sensory deficit - BLE's intact to light touch   Dural Signs negative R/L SLR     ROM  BLE's WFL and painfree    Flexibility  Hamstrings R fair plus, L good  Piriformis R/L good  Gastrocs R/L fair    Outcome Measures:  Other Measures  Lower Extremity Funtional Score (LEFS): 42/80     Treatments:  Discussed PT purpose and POC.  Instructed in the following exercise to be performed 3x/day.  Patient educated in use of rolled or folded towel behind LB  "in sitting to improve postural alignment.  Patient educated in centralization/peripheralization of symptoms as they relate to symptoms.  Patient advised to stop exercise if pain in LB/leg increases. Patient verbalizes understanding and agreement.   -Prone press ups  with self overpressure 3\" hold 2 x 10 reps  Written instructions provided for reference.     EDUCATION:  Access Code: [email or web address]  URL: [email or web address]  Date: 08/05/2024  Prepared by: Kourtney Pradhan    Exercises  - Prone Press Up On Elbows  - 1 x daily - 7 x weekly - 1 sets - 1 reps - 60\" hold  - Prone Press Up  - 3 x daily - 7 x weekly - 1-2 sets - 10 reps - 3\"  hold  Outpatient Education  Individual(s) Educated: Patient  Education Provided: Home Exercise Program, POC  Risk and Benefits Discussed with Patient/Caregiver/Other: yes  Patient/Caregiver Demonstrated Understanding: yes  Plan of Care Discussed and Agreed Upon: yes    Goals:  Active       PT Problem       Pain       Start:  08/05/24    Expected End:  09/30/24       Patient will report reduction of pain/ symptoms by  80%  to ease performance of  ADLs, leisure activities and work/household tasks.           Lumbar AROM       Start:  08/05/24    Expected End:  09/30/24       Patient will demonstrate full, painfree and noncompensatory spinal ROM  to ease the performance of prolonged standing and walking and ascending stairs.            Strength       Start:  08/05/24    Expected End:  09/30/24       Patient will increase trunk and LE strength by 1/3 MMT grade to facilitate endurance for standing and walking without pain.          HEP       Start:  08/05/24    Expected End:  09/30/24       Patient will demonstrate independence and compliance with safe and recommended  HEP in order to maximize and maintain functional gains made in physical therapy.          Outcome       Start:  08/05/24    Expected End:  09/30/24       Patient will improve outcome measures score on  LEFS by " 20 pts  to show a clinically significant improvement  in functional mobility.          Patient Stated Goal 1       Start:  08/05/24    Expected End:  09/30/24       Patient will reports decreased frequency of difficulty with ascending steps with right LE by 75%.                        Current Participants as of 8/5/2024    Name Type Comments Contact Info    Jyoti Ocasio MD Referring Provider  [phone]    Signature pending    Kourtney Pradhan PT Physical Therapist  [phone]    Signature pending

## 2024-08-05 NOTE — PROGRESS NOTES
Physical Therapy    Physical Therapy Evaluation and Treatment      Patient Name: Kentrell Munguia  MRN: [id number]  Today's Date: 8/5/2024    Time Entry:   Time Calculation  Start Time: 0850  Stop Time: 0935  Time Calculation (min): 45 min  PT Evaluation Time Entry  PT Evaluation (Low) Time Entry: 37  PT Therapeutic Procedures Time Entry  Therapeutic Exercise Time Entry: 8    Assessment:  Patient  is a 68 yr old male  presenting to PT Clinic with diagnosis of right hip pain, arthritis and lumbar spondylosis. Patient describes intermittent discomfort in right LB/posterior hip mostly noted  with ascending stairs with RLE.  Clinical examination reveals  possible directional preference for lumbar extension, decreased lumbar AROM, weakness in trunk and DLS, difficulty with ascending steps with RLE.  Resulting in limited participation in pain-free ADLs and inability to perform at their prior level of function.    The patient will benefit from skilled PT services to address above listed impairments/deficits in order to improve functional abilities, decrease pain/discomfort  and return to PLOF and improve QoL.          Plan:  Continue to assess for directional preference in the next few sessions.  Once pain decreased, will progress to include lumbar AROM exercises all planes, core strengthening, LQ stretching, and education in back care and body mechanics for spinal protection.   OP PT Plan  Treatment/Interventions: Cryotherapy, Education/ Instruction, Manual therapy, Taping techniques, Therapeutic activities, Therapeutic exercises  PT Plan: Skilled PT  PT Frequency: 2 times per week  Duration: 4-6 weeks  Rehab Potential: Good  Plan of Care Agreement: Patient    Current Problem:   1. Lumbar spondylosis  Referral to Physical Therapy    Follow Up In Physical Therapy      2. Right hip pain  Referral to Physical Therapy      3. Arthritis of right hip  Referral to Physical Therapy      4. Tendonitis of right hip  Referral to  Physical Therapy        General  General  Reason for Referral: Right hip pain M25.551; Right hip arthritis M16.11; lumbar spondylosis M47.816; right hip tendonitis M76.891  Referred By: Dr. Dion Ocasio  General Comment: 1 (Insurance: MEDICARE A/B, [place], [place] AUTH ( 0$ USED PT/OT))     Precautions  Precautions  STEADI Fall Risk Score (The score of 4 or more indicates an increased risk of falling): 1  Precautions Comment: h/o prostate cancer     Subjective:   Chief Complaint: Patient presents to clinic  with intermittent pain/discomfort in the back of right hip.  States his right hip stops working and the  right leg doesn't want to move forward.  Occasionally greg, happening more frequently. Doesn't normally have back pain. Feels pain in right buttocks when  going up stairs with right LE.  Doesn't really know how to describe symptoms.  Has to drag leg sometimes because it doesn't want to go forward.   Onset Date: ~ 3 yrs  RENU: denies trauma or injury, gradual.     Current Condition:   Worse, more frequently.    Pain:  Location: right LB/LS  and SI area  Description: tightness, discomfort  Aggravating Factors:  pressure on right leg to ascend  steps, twisting to the right, sometimes walking.   Relieving Factors:  rest, time, going easy on it for a little then it gets better.     Functional limitations:  Walking and dragging foot, stairs, difficulty with ADLs when right leg is acting up.     Patient goals:  Find out what is causing the hip problem.   Previous history of back pain over the years. But no surgery or PT.     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     Patients Living Environment: Reviewed and no concern  Lives  with wife.     Primary Language: English    Red Flags: Do you have any of the following? No  Fever/chills, unexplained weight changes, dizziness/fainting, unexplained change in bowel or bladder functions, unexplained malaise or muscle weakness, night pain/sweats, numbness or tingling.     Objective   Gait  Ambulates indep without assistive device with good gait pattern without LOB.  However, mod FH and kypholordotic posture.     Posture:   Mod FH, right shoulder girdle elevated slightly, slight left lateral trunk shift, kypholordotic spine.    Lumbar AROM:   Flexion 75% no pain, decreased reversal of curve  Extension 50% no pain  Rotation R WFL tightness end range  Rotation L  WFL tightness end range  Side Gliding hips R   WFL no pain  Side Gliding hips L WFL no pain    Repeated Test Movements:  Pretest symptoms standing -  Rep Flexion in Standing x 10 reps - no change  Rep Extension in Standing  x 10 reps - no change   Rep SGIS R WFL, no change  Rep SGIS L WFL, no change    Pretest symptoms in lying - no pain or symptoms  Flexion - no pain  Rep Flexion in lying x 10 reps,   Extension - no pain, but decreased ROM   Rep Extension in lying  x 10 reps , no change in pain but slight increase in motion.    Strength:  Hip flexion sitting R    5/5      L  5/5  Hip flexion supine R 4/5        L   4/5  Bridge 100%   Hip Abduction R 4/5       L 4/5  Hip Extension R 3+/5       L 3+/5  Abdominals poor  Dynamic Lumbar Stabilization poor  Knee Extension R   5/5   L 5/5  Knee Flexion R 5/5   L 5/5  Ankle DF  R 5/5     L 5/5  Ankle PF R 5/5    L 5/5    Neurological:   Sensory deficit - BLE's intact to light touch   Dural Signs negative R/L SLR     ROM  BLE's WFL and painfree    Flexibility  Hamstrings R fair plus, L good  Piriformis R/L good  Gastrocs R/L fair    Outcome Measures:  Other Measures  Lower Extremity Funtional Score (LEFS): 42/80     Treatments:  Discussed PT purpose and POC.  Instructed in the following exercise to be  "performed 3x/day.  Patient educated in use of rolled or folded towel behind LB in sitting to improve postural alignment.  Patient educated in centralization/peripheralization of symptoms as they relate to symptoms.  Patient advised to stop exercise if pain in LB/leg increases. Patient verbalizes understanding and agreement.   -Prone press ups  with self overpressure 3\" hold 2 x 10 reps  Written instructions provided for reference.     EDUCATION:  Access Code: [email or web address]  URL: [email or web address]  Date: 08/05/2024  Prepared by: Kourtney Pradhan    Exercises  - Prone Press Up On Elbows  - 1 x daily - 7 x weekly - 1 sets - 1 reps - 60\" hold  - Prone Press Up  - 3 x daily - 7 x weekly - 1-2 sets - 10 reps - 3\"  hold  Outpatient Education  Individual(s) Educated: Patient  Education Provided: Home Exercise Program, POC  Risk and Benefits Discussed with Patient/Caregiver/Other: yes  Patient/Caregiver Demonstrated Understanding: yes  Plan of Care Discussed and Agreed Upon: yes    Goals:  Active       PT Problem       Pain       Start:  08/05/24    Expected End:  09/30/24       Patient will report reduction of pain/ symptoms by  80%  to ease performance of  ADLs, leisure activities and work/household tasks.           Lumbar AROM       Start:  08/05/24    Expected End:  09/30/24       Patient will demonstrate full, painfree and noncompensatory spinal ROM  to ease the performance of prolonged standing and walking and ascending stairs.            Strength       Start:  08/05/24    Expected End:  09/30/24       Patient will increase trunk and LE strength by 1/3 MMT grade to facilitate endurance for standing and walking without pain.          HEP       Start:  08/05/24    Expected End:  09/30/24       Patient will demonstrate independence and compliance with safe and recommended  HEP in order to maximize and maintain functional gains made in physical therapy.          Outcome       Start:  08/05/24    Expected " End:  09/30/24       Patient will improve outcome measures score on  LEFS by 20 pts  to show a clinically significant improvement  in functional mobility.          Patient Stated Goal 1       Start:  08/05/24    Expected End:  09/30/24       Patient will reports decreased frequency of difficulty with ascending steps with right LE by 75%.

## 2024-08-06 NOTE — PROGRESS NOTES
Subjective   Patient ID: Kentrell Munguia is a 68 y.o. male who presents for Post-op (POV Abscess of Back).  HPI  This is a gentleman I saw 2 weeks ago with an abscess on his back secondary to an infected sebaceous cyst.  We drained that in the office and here he is is today for another follow-up visit.  Review of Systems  10 point review is otherwise  Objective   Physical Exam on examination of the back he has a small opening now it is granulating there is not nearly the erythema or changes to the skin around this area as there was before.  It is almost healed I changed the bandage for the patient and placed Aquacel in the wound instead of using the plain packing gauze    Assessment/Plan resolving abscess of the back secondary to an infected sebaceous cyst recommended to the patient that he follow-up in another 2 to 4 weeks and at that time if there is still some residual cyst that is palpable we can plan an elective excision.           Aggie Bonilla MD 08/06/24 7:45 AM

## 2024-08-08 ENCOUNTER — APPOINTMENT (OUTPATIENT)
Dept: SURGERY | Facility: CLINIC | Age: 69
End: 2024-08-08
Payer: MEDICARE

## 2024-08-12 ENCOUNTER — TREATMENT (OUTPATIENT)
Dept: PHYSICAL THERAPY | Facility: CLINIC | Age: 69
End: 2024-08-12
Payer: MEDICARE

## 2024-08-12 DIAGNOSIS — M47.816 LUMBAR SPONDYLOSIS: ICD-10-CM

## 2024-08-12 PROCEDURE — 97110 THERAPEUTIC EXERCISES: CPT | Mod: GP
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
## 2024-08-16 ENCOUNTER — HOSPITAL ENCOUNTER (OUTPATIENT)
Dept: RADIOLOGY | Facility: HOSPITAL | Age: 69
Discharge: HOME | End: 2024-08-16
Payer: MEDICARE

## 2024-08-16 DIAGNOSIS — C61 PROSTATE CANCER (MULTI): ICD-10-CM

## 2024-08-16 PROCEDURE — A9575 INJ GADOTERATE MEGLUMI 0.1ML: HCPCS | Performed by: UROLOGY

## 2024-08-16 PROCEDURE — 72197 MRI PELVIS W/O & W/DYE: CPT

## 2024-08-16 PROCEDURE — 2550000001 HC RX 255 CONTRASTS: Performed by: UROLOGY

## 2024-08-16 PROCEDURE — 72197 MRI PELVIS W/O & W/DYE: CPT | Performed by: RADIOLOGY

## 2024-08-16 RX ORDER — GADOTERATE MEGLUMINE 376.9 MG/ML
0.2 INJECTION INTRAVENOUS
Status: COMPLETED | OUTPATIENT
Start: 2024-08-16 | End: 2024-08-16

## 2024-08-16 RX ORDER — GADOTERATE MEGLUMINE 376.9 MG/ML
INJECTION INTRAVENOUS
Status: CANCELLED | OUTPATIENT
Start: 2024-08-16

## 2024-08-19 ENCOUNTER — TREATMENT (OUTPATIENT)
Dept: PHYSICAL THERAPY | Facility: CLINIC | Age: 69
End: 2024-08-19
Payer: MEDICARE

## 2024-08-19 ENCOUNTER — OFFICE VISIT (OUTPATIENT)
Dept: PHYSICAL MEDICINE AND REHAB | Facility: CLINIC | Age: 69
End: 2024-08-19
Payer: MEDICARE

## 2024-08-19 VITALS
DIASTOLIC BLOOD PRESSURE: 65 MMHG | HEART RATE: 65 BPM | RESPIRATION RATE: 18 BRPM | TEMPERATURE: 97.7 F | SYSTOLIC BLOOD PRESSURE: 107 MMHG

## 2024-08-19 DIAGNOSIS — M47.816 LUMBAR SPONDYLOSIS: Primary | ICD-10-CM

## 2024-08-19 DIAGNOSIS — M76.891 TENDONITIS OF RIGHT HIP: ICD-10-CM

## 2024-08-19 DIAGNOSIS — M47.816 LUMBAR SPONDYLOSIS: Primary | Chronic | ICD-10-CM

## 2024-08-19 DIAGNOSIS — M16.11 ARTHRITIS OF RIGHT HIP: ICD-10-CM

## 2024-08-19 DIAGNOSIS — M25.551 RIGHT HIP PAIN: ICD-10-CM

## 2024-08-19 PROCEDURE — 1126F AMNT PAIN NOTED NONE PRSNT: CPT | Performed by: PHYSICAL MEDICINE & REHABILITATION

## 2024-08-19 PROCEDURE — 1160F RVW MEDS BY RX/DR IN RCRD: CPT | Performed by: PHYSICAL MEDICINE & REHABILITATION

## 2024-08-19 PROCEDURE — 97110 THERAPEUTIC EXERCISES: CPT | Mod: GP

## 2024-08-19 PROCEDURE — 3074F SYST BP LT 130 MM HG: CPT | Performed by: PHYSICAL MEDICINE & REHABILITATION

## 2024-08-19 PROCEDURE — 4004F PT TOBACCO SCREEN RCVD TLK: CPT | Performed by: PHYSICAL MEDICINE & REHABILITATION

## 2024-08-19 PROCEDURE — 99213 OFFICE O/P EST LOW 20 MIN: CPT | Performed by: PHYSICAL MEDICINE & REHABILITATION

## 2024-08-19 PROCEDURE — 1159F MED LIST DOCD IN RCRD: CPT | Performed by: PHYSICAL MEDICINE & REHABILITATION

## 2024-08-19 PROCEDURE — 3078F DIAST BP <80 MM HG: CPT | Performed by: PHYSICAL MEDICINE & REHABILITATION

## 2024-08-19 ASSESSMENT — PAIN SCALES - GENERAL: PAINLEVEL: 0-NO PAIN

## 2024-08-19 NOTE — PROGRESS NOTES
"Physical Therapy    Physical Therapy Treatment    Patient Name: Kentrell Munguia  MRN: [id number]  Today's Date: 8/19/2024    Time Entry:   Time Calculation  Start Time: 0800  Stop Time: 0843  Time Calculation (min): 43 min     PT Therapeutic Procedures Time Entry  Therapeutic Exercise Time Entry: 43                   Assessment:  Patient instructed in standing back bends to replace prone press ups during the day since patient is unable to consistently perform the later.   Patient tolerated exercises well without complaints of increased pain during or after session.  Progressing well with present program.   The patient will benefit from continued skilled PT services to address  impairments/deficits chandrakant improve functional abilities, decrease pain/discomfort  and return to PLOF.     Plan:  Assess response to today's session and adjust as needed.   Continue per POC, progressing as tolerated.   Treatment/Interventions: Cryotherapy, Education/ Instruction, Manual therapy, Taping techniques, Therapeutic activities, Therapeutic exercises  PT Plan: Skilled PT  PT Frequency: 2 times per week  Duration: 4-6 weeks       Current Problem  1. Lumbar spondylosis  Follow Up In Physical Therapy      2. Right hip pain        3. Arthritis of right hip        4. Tendonitis of right hip            General  General  Reason for Referral: Right hip pain M25.551; Right hip arthritis M16.11; lumbar spondylosis M47.816; right hip tendonitis M76.891  Referred By: Dr. Dion Ocasio  General Comment:  Visit 3   Insurance: MEDICARE A/B, MMO [facility], [place], NO AUTH ( 0$ USED PT/OT))           Subjective    Weekend  was \"not too bad\".  Reports no pain at present.  States had a good week last week and noticed less duration of symptoms.  When puts extra weight on right side is when he feels it the most.  No worse after last session. Doing HEP but can't do the press ups 3x/day, only 2x/day.     Precautions  Precautions  STEADI Fall Risk Score (The score of " "4 or more indicates an increased risk of falling): 1  Precautions Comment: h/o prostate cancer     Pain  0/10 at the start and end of session     Treatments:  Therapeutic exercises to increase ROM/flexibility, increase strength, to increase function.      Recumbent scifit 13, level 1.5 x 10 mins    Standing   R/L hip flexor stretch at steps 10\" x 20 reps     Prone  Prone on elbows with deep breathing x 10 breaths  Prone press ups  with self overpressure 3\" hold 1 x 10 reps  R/L knee flexion 3\" hold 2 x 10 reps   B bent knee hip IR/ER 1 x 20  reps      Hooklying/supine  TA bracing 3\" hold 1 x 15 reps  TA bracing with B isometric hip ADD ball squeeze 3-5\" hold 1 x 15 reps  Bridges with ball squeeze 3\" hold 2 x 15 reps    Standing   Back bends 3\" hold 1 x 15 reps  SB overhead rolls at the wall to increase spinal extension 5\" hold 1 x 10 reps    OP EDUCATION:  Continue with  HEP, at tolerated.  Added hip strengthening as stated below.  Advised to stop exercises if pain increased.   Access Code: [email or web address]  URL: [email or web address]  Date: 08/12/2024  Prepared by: Kourtney Pradhan     Exercises  - Prone Press Up On Elbows  - 1 x daily - 7 x weekly - 1 sets - 1 reps - 60\" hold  - Prone Press Up  - 3 x daily - 7 x weekly - 1-2 sets - 10 reps - 3\"  hold  - Supine Bridge with Mini Swiss Ball Between Knees  - 1 x daily - 7 x weekly - 3 sets - 10 reps - 3\" hold  - Hooklying Clamshell with Resistance  - 1 x daily - 7 x weekly - 3 sets - 10 reps           Goals:  Active       PT Problem       Pain       Start:  08/05/24    Expected End:  09/30/24       Patient will report reduction of pain/ symptoms by  80%  to ease performance of  ADLs, leisure activities and work/household tasks.           Lumbar AROM       Start:  08/05/24    Expected End:  09/30/24       Patient will demonstrate full, painfree and noncompensatory spinal ROM  to ease the performance of prolonged standing and walking and ascending stairs.    "         Strength       Start:  08/05/24    Expected End:  09/30/24       Patient will increase trunk and LE strength by 1/3 MMT grade to facilitate endurance for standing and walking without pain.          HEP       Start:  08/05/24    Expected End:  09/30/24       Patient will demonstrate independence and compliance with safe and recommended  HEP in order to maximize and maintain functional gains made in physical therapy.          Outcome       Start:  08/05/24    Expected End:  09/30/24       Patient will improve outcome measures score on  LEFS by 20 pts  to show a clinically significant improvement  in functional mobility.          Stairs       Start:  08/05/24    Expected End:  09/30/24       Patient will report increased ease with ascending steps with right LE by 80%.

## 2024-08-19 NOTE — PROGRESS NOTES
Physical Medicine and Rehabilitation JENNIFER   08/19/24       Chief Complaint:  Low back pain     HPI:  Kentrell Munguia is a  68 y.o. M who presents to the clinic today  for evaluation of R hip pain.    recall  Onset: 1.5 years ago no trauma  When sitting for a while and then sometimes gives out  Feels anterior thigh pain and sometimes can't put any weight on it  Feels like something is tweaked above the groin  Also pain in the right lower back  Sometimes twisting  Location: as above  Radiation: as above  Quality: feels sharp  Duration: comes and goes  Aggravating factors:  pressure and walking, getting in and out of the car  Alleviating factors: sitting and sleeping  Severity: 0  Numbness/Tingling: No  Bowel or bladder incontinence: No  Pt's current medication regimen includes:      Treatment to date:  Physical therapy: No  Medications taken to date for this complaint include the following:   none  Prior injections: No       No falls, but close calls.       He was last seen in June 2024. At that time we discussed:     1, xr l spine  2. Pt for R hip and L spine  3. Not interested in meds  4. Can consider referral for diagnostic and therapeutic steroid injections R hip    Has not had any pain episode. Had two sessions of therapy.   Not interested in meds. Not interested in meds.     Imaging  Reviewed 08/19/24 w patient and personally  Xr L spine 6/2024  FINDINGS:  Alignment:  No significant scoliosis. Mild-to-moderate spondylosis.  This includes moderate marginal osteophyte formation, greater at the  L1-2 level including left laterally. Moderate narrowing of the L4-5  disc interspace with a retrolisthesis. Apophyseal hypertrophy  particularly at L4-5 and L5-S1 levels.      Disc levels:  Maintained      Bony structures:  Intact      Other findings:  None significant      IMPRESSION:  Spondylosis as described.    Xr pelvis 7/2023  TECHNIQUE:  AP pelvis and  AP and frogleg lateral  Left hip radiographs.     FINDINGS:  No  fracture or dislocation is evident.There is moderate right and  mild-to-moderate left hip degenerative change. There is subtle  appreciation of mottled increased density in the inter trochanteric  left femur the correlates with the findings seen on the prior CT.      Impression   1. Subtle appreciation structure in the intertrochanteric left femur  with similar differential considerations as discussed on the prior  CT. Follow-up can be considered as clinically warranted.  2. Degenerative change as above.       No past medical history on file.     Past Surgical History:   Procedure Laterality Date    OTHER SURGICAL HISTORY  07/07/2020    Vasectomy        Patient Active Problem List    Diagnosis Date Noted    Stage 3a chronic kidney disease (Multi) 07/09/2024    Abscess of back 07/09/2024    Nocturia 06/24/2024    Right hip pain 06/24/2024    Cutaneous abscess of back excluding buttocks 06/24/2024    Tubular adenoma of colon 12/18/2023    Smoking addiction 12/18/2023    Benign essential hypertension 06/16/2023    Chronic pain of both shoulders 06/16/2023    Dependence on nicotine from cigarettes 06/16/2023    Dyslipidemia 06/16/2023    Interstitial lung disease (Multi) 06/16/2023    Mild vitamin D deficiency 06/16/2023    Multiple pulmonary nodules 06/16/2023    DAVID (obstructive sleep apnea) 06/16/2023    Prostate cancer (Multi) 06/16/2023    Tubulovillous adenoma of colon 06/16/2023    Coronary artery disease due to calcified coronary lesion 06/16/2023    Encounter for screening for malignant neoplasm of colon 06/16/2023    Bone lesion 06/16/2023    Prediabetes 06/16/2023    BMI 33.0-33.9,adult 06/16/2023        Family History   Problem Relation Name Age of Onset    Heart attack Mother          Current Outpatient Medications   Medication Sig Dispense Refill    aspirin 81 mg EC tablet Take 1 tablet (81 mg) by mouth once daily.      doxazosin (Cardura) 4 mg tablet Take 1 tablet (4 mg) by mouth once daily at bedtime. 90  tablet 3    losartan-hydrochlorothiazide (Hyzaar) 100-25 mg tablet Take 1 tablet by mouth once daily. 30 tablet 11    rosuvastatin (Crestor) 40 mg tablet TAKE ONE TABLET BY MOUTH ONCE DAILY 90 tablet 3     No current facility-administered medications for this visit.        No Known Allergies     Social History     Socioeconomic History    Marital status:      Spouse name: Sabina    Number of children: 3   Occupational History    Occupation: retired/ [employer]   Tobacco Use    Smoking status: Former     Average packs/day: 0.5 packs/day for 54.0 years (27.0 ttl pk-yrs)     Types: Cigarettes     Start date: 1970    Smokeless tobacco: Never   Vaping Use    Vaping status: Never Used   Substance and Sexual Activity    Alcohol use: Yes     Alcohol/week: 4.0 standard drinks of alcohol     Types: 4 Shots of liquor per week    Drug use: Yes     Types: Marijuana    Sexual activity: Not Currently     Partners: Female   Htn  Hlp  BPH    Retired but works at wood shop  Lives w his wife  Quit smoking 6 months  Htc and 4 drinks a week       Review of Systems:  Constitutional:  Denies fever or chills, malaise, weight changes.   Eyes:  Denies change in visual acuity   HENT:  Denies nasal congestion or sore throat   Respiratory:  Denies cough or shortness of breath   Cardiovascular:  Denies chest pain or edema   GI:  Denies abdominal pain, nausea, vomiting, bloody stools or diarrhea   :  Denies dysuria   Integument:  Denies rash   Neurologic:  As per HPI  MSK: Per above HPI  Endocrine:  Denies polyuria or polydipsia   Lymphatic:  Denies swollen glands   Psychiatric:  Denies depression or anxiety            PHYSICAL EXAM:  /65 (BP Location: Left arm, Patient Position: Sitting)   Pulse 65   Temp 36.5 °C (97.7 °F)   Resp 18       General:  NAD, well developed, M      Psychiatric: appropriate mood & affect.   Cardiovascular:  Normal pedal pulses; no LE edema.  Respiratory:  Normal rate; unlabored breathing.  Skin:   Intact; no erythema; no ecchymosis or rash.  Lymphatic:  No lymphadenopathy or lymphedema.  NEURO:  Alert and appropriate. Speech fluent, conversing appropriately.   Motor:    Rt: HF 5/5, KE 5/5, KF 5/5, DF 5/5, EHL 5/5, PF 5/5.    Lt: HF 5/5, KE 5/5, KF 5/5, DF 5/5, EHL 5/5, PF 5/5.  Sensation:     Light touch: intact in the b/l L3-S1 dermatomes.    Reflexes:     Right:  patellar 2+, achilles 2+,     Left: patellar 2+, achilles 2+,     Babinski's downgoing b/l; no clonus  Gait: Normal, narrow based gait.     MSK:  Inspection reveals no evidence of a pelvic obliqity   Spinal range of motion: Flexion to 80° degrees, Extension of 10 degrees.  There is tenderness over the R anterior thigh and R lateral thigh.   Special tests:    Straight leg raise: R lumbar ttp    Slump test:    Facet loading: + R   Pain w internal rotation mild  Slightly antalgic gait         Impression: Kentrell Munguia is a 68 y.o. M w pmh of htn, hlp presenting with R groin pain and R lower back due to R hip arthritis and likely spondylosis.     Plan:   1, xr l spine showed spondylosis L1/2, L4/5, L5/s1  2. Pt for R hip and L spine; continue   3. Not interested in meds  4. Can consider referral for diagnostic and therapeutic steroid injections R hip, wants to hold off likely pain is related to hip    Fu 8 weeks          Jyoti Ocasio MD  Physical Medicine and Rehabilitation

## 2024-08-26 ENCOUNTER — APPOINTMENT (OUTPATIENT)
Dept: PHYSICAL THERAPY | Facility: CLINIC | Age: 69
End: 2024-08-26
Payer: MEDICARE

## 2024-08-27 DIAGNOSIS — I10 BENIGN ESSENTIAL HYPERTENSION: ICD-10-CM

## 2024-08-27 RX ORDER — LOSARTAN POTASSIUM AND HYDROCHLOROTHIAZIDE 25; 100 MG/1; MG/1
1 TABLET ORAL DAILY
Qty: 30 TABLET | Refills: 11 | Status: SHIPPED | OUTPATIENT
Start: 2024-08-27 | End: 2025-08-27

## 2024-08-30 ENCOUNTER — TREATMENT (OUTPATIENT)
Dept: PHYSICAL THERAPY | Facility: CLINIC | Age: 69
End: 2024-08-30
Payer: MEDICARE

## 2024-08-30 DIAGNOSIS — M47.816 LUMBAR SPONDYLOSIS: ICD-10-CM

## 2024-08-30 DIAGNOSIS — M25.551 RIGHT HIP PAIN: Primary | ICD-10-CM

## 2024-08-30 DIAGNOSIS — M16.11 ARTHRITIS OF RIGHT HIP: ICD-10-CM

## 2024-08-30 DIAGNOSIS — M76.891 TENDONITIS OF RIGHT HIP: ICD-10-CM

## 2024-08-30 PROCEDURE — 97110 THERAPEUTIC EXERCISES: CPT | Mod: GP

## 2024-08-30 ASSESSMENT — PAIN SCALES - GENERAL: PAINLEVEL_OUTOF10: 0 - NO PAIN

## 2024-08-30 ASSESSMENT — PAIN - FUNCTIONAL ASSESSMENT: PAIN_FUNCTIONAL_ASSESSMENT: 0-10

## 2024-08-30 NOTE — PROGRESS NOTES
"Physical Therapy    Physical Therapy Treatment    Patient Name: Kentrell Munguia  MRN: [id number]  Today's Date: 8/30/2024    Time Entry:   Time Calculation  Start Time: 1037  Stop Time: 1118  Time Calculation (min): 41 min     PT Therapeutic Procedures Time Entry  Therapeutic Exercise Time Entry: 41                   Assessment: Pt continues to report no pain, able to tolerate exercise progression and has implemented standing lumbar extension at work.     Plan: continue to progress treatment per POC and pt tolerance       Current Problem  1. Right hip pain        2. Lumbar spondylosis  Follow Up In Physical Therapy      3. Arthritis of right hip        4. Tendonitis of right hip            General  PT  Visit  PT Received On: 08/30/24  Response to Previous Treatment: Patient with no complaints from previous session. (Pt has not had an issue)  General  Reason for Referral: Right hip pain M25.551; Right hip arthritis M16.11; lumbar spondylosis M47.816; right hip tendonitis M76.891  Referred By: Dr. Dion Ocasio  General Comment: 1 (Insurance: MEDICARE A/B, MMO [facility], [place], NO AUTH ( 0$ USED PT/OT))  Every now and again pt does get twinges. Pt has gotten used to the pain and has compensated by not leading with RLE due to symptoms. Last weekend went to [facility] and had no issues.  Subjective    Precautions  Precautions  STEADI Fall Risk Score (The score of 4 or more indicates an increased risk of falling): 1  Precautions Comment: h/o prostate cancer       Pain  Pain Assessment  Pain Assessment: 0-10  0-10 (Numeric) Pain Score: 0 - No pain    Objective   N/T    Treatments:  Therapeutic exercises to increase ROM/flexibility, increase strength, to increase function.      Recumbent scifit 13, level 1.5 x 8 mins     Standing   R/L hip flexor stretch at steps 10\" x 20 reps     Prone:  Prone press ups  with self overpressure 3\" hold 1 x 10 reps  R/L knee flexion 3\" (unable to complete due to HS cramping  R/L hip extension " "2x10 ea  B bent knee hip IR/ER 1 x 20  reps      Hooklying/supine  TA bracing 3\" hold 1 x 15 reps  Bridges with ball squeeze 3\" hold 2 x 15 reps  Sidelying clamshells 2x10 ea R/L     Standing   SB overhead rolls at the wall to increase spinal extension 5\" hold 1 x 10 reps    OP EDUCATION:   Educated pt on standing hip flexor stretch at home with stable surface    Goals:  Active       PT Problem       Pain       Start:  08/05/24    Expected End:  09/30/24       Patient will report reduction of pain/ symptoms by  80%  to ease performance of  ADLs, leisure activities and work/household tasks.           Lumbar AROM       Start:  08/05/24    Expected End:  09/30/24       Patient will demonstrate full, painfree and noncompensatory spinal ROM  to ease the performance of prolonged standing and walking and ascending stairs.            Strength       Start:  08/05/24    Expected End:  09/30/24       Patient will increase trunk and LE strength by 1/3 MMT grade to facilitate endurance for standing and walking without pain.          HEP       Start:  08/05/24    Expected End:  09/30/24       Patient will demonstrate independence and compliance with safe and recommended  HEP in order to maximize and maintain functional gains made in physical therapy.          Outcome       Start:  08/05/24    Expected End:  09/30/24       Patient will improve outcome measures score on  LEFS by 20 pts  to show a clinically significant improvement  in functional mobility.          Stairs       Start:  08/05/24    Expected End:  09/30/24       Patient will report increased ease with ascending steps with right LE by 80%.           "

## 2024-09-04 NOTE — PROGRESS NOTES
HPI:  Proc (9/15/20): prostate biopsy performed by Dr. Jairo Harrington   Path: prostatic adenocarcinoma, GG1 (Howell score 3+3=6), 1/2 cores (5% of tissue)    Kentrell Munguia is a 68 y.o. male referred by Dr. Fuchs for GG1 prostate cancer. Hx of CKD stage 3, HTN, DAVID, CAD. S/p prostate biopsy (9/15/20) with pathology showing prostatic adenocarcinoma, GG1 (Bernadette score 3+3=6), 1/2 cores (5% of tissue). MRI Prostate (8/16/24) showed 63.8g, BPH changes of the TZ, diffuse non nodular hypointensities within the PZ, without evidence of focally restricted diffusion (PI-RADS 2). Good urinary and sexual function.     No FHx of prostate cancer.    PSA: 14.18 (6/24/24)    MRI Prostate (8/16/24): 63.8g, BPH changes of the TZ, diffuse non nodular hypointensities within the PZ, without evidence of focally restricted diffusion (PI-RADS 2), T1 hyperintense lesion in the left femoral neck, unchanged from prior, nonspecific on the current exam.    Review of Systems:  All systems reviewed. Anything negative noted in the HPI.    Physical Exam:  Virtual visit.     Procedures:    Assessment/Plan   Kentrell Munguia is a 68 y.o. male referred by Dr. Fuchs for GG1 prostate cancer. Hx of CKD stage 3, HTN, DAVID, CAD. S/p prostate biopsy (9/15/20) with pathology showing prostatic adenocarcinoma, GG1 (Bernadette score 3+3=6), 1/2 cores (5% of tissue). MRI Prostate (8/16/24) showed 63.8g, BPH changes of the TZ, diffuse non nodular hypointensities within the PZ, without evidence of focally restricted diffusion (PI-RADS 2). Good urinary and sexual function. Management options including risks, benefits and alternatives discussed at length and all questions answered. Patient prefers to proceed with prostate biopsy with Dr. Sandy.             Scribe Attestation:  By signing my name below, Magda HERNANDEZ Scribe   attest that this documentation has been prepared under the direction and in the presence of Femi Bennett MD.

## 2024-09-05 ENCOUNTER — APPOINTMENT (OUTPATIENT)
Dept: UROLOGY | Facility: CLINIC | Age: 69
End: 2024-09-05
Payer: MEDICARE

## 2024-09-05 DIAGNOSIS — C61 PROSTATE CANCER (MULTI): Primary | ICD-10-CM

## 2024-09-06 ENCOUNTER — APPOINTMENT (OUTPATIENT)
Dept: PHYSICAL THERAPY | Facility: CLINIC | Age: 69
End: 2024-09-06
Payer: MEDICARE

## 2024-09-06 DIAGNOSIS — C61 PROSTATE CANCER (MULTI): ICD-10-CM

## 2024-09-06 DIAGNOSIS — Z79.2 PROPHYLACTIC ANTIBIOTIC: ICD-10-CM

## 2024-09-06 DIAGNOSIS — R39.9 LOWER URINARY TRACT SYMPTOMS (LUTS): ICD-10-CM

## 2024-09-06 RX ORDER — CIPROFLOXACIN 500 MG/1
TABLET ORAL
Qty: 3 TABLET | Refills: 0 | Status: SHIPPED | OUTPATIENT
Start: 2024-09-06

## 2024-09-09 ENCOUNTER — TREATMENT (OUTPATIENT)
Dept: PHYSICAL THERAPY | Facility: CLINIC | Age: 69
End: 2024-09-09
Payer: MEDICARE

## 2024-09-09 ENCOUNTER — APPOINTMENT (OUTPATIENT)
Dept: SURGERY | Facility: CLINIC | Age: 69
End: 2024-09-09
Payer: MEDICARE

## 2024-09-09 VITALS
WEIGHT: 240 LBS | HEART RATE: 70 BPM | OXYGEN SATURATION: 95 % | TEMPERATURE: 97.7 F | HEIGHT: 68 IN | DIASTOLIC BLOOD PRESSURE: 75 MMHG | SYSTOLIC BLOOD PRESSURE: 111 MMHG | BODY MASS INDEX: 36.37 KG/M2

## 2024-09-09 DIAGNOSIS — M16.11 ARTHRITIS OF RIGHT HIP: ICD-10-CM

## 2024-09-09 DIAGNOSIS — M76.891 TENDONITIS OF RIGHT HIP: ICD-10-CM

## 2024-09-09 DIAGNOSIS — M25.551 RIGHT HIP PAIN: ICD-10-CM

## 2024-09-09 DIAGNOSIS — M47.816 LUMBAR SPONDYLOSIS: Primary | ICD-10-CM

## 2024-09-09 DIAGNOSIS — L02.212 ABSCESS OF BACK: Primary | ICD-10-CM

## 2024-09-09 PROCEDURE — 3078F DIAST BP <80 MM HG: CPT | Performed by: SURGERY

## 2024-09-09 PROCEDURE — 3008F BODY MASS INDEX DOCD: CPT | Performed by: SURGERY

## 2024-09-09 PROCEDURE — 3074F SYST BP LT 130 MM HG: CPT | Performed by: SURGERY

## 2024-09-09 PROCEDURE — 1159F MED LIST DOCD IN RCRD: CPT | Performed by: SURGERY

## 2024-09-09 PROCEDURE — 97530 THERAPEUTIC ACTIVITIES: CPT | Mod: GP

## 2024-09-09 PROCEDURE — 99213 OFFICE O/P EST LOW 20 MIN: CPT | Performed by: SURGERY

## 2024-09-09 NOTE — PROGRESS NOTES
Subjective   Patient ID: Kentrell Munguia is a 68 y.o. male who presents for Follow-up (Abscess of back ).  HPI  This is a patient here for follow-up after incision and drainage of his sebaceous cyst on his back.  Review of Systems 10 point review is negative    Objective   Physical Exam on exam the area is now healed there is a residual incision there and probably some cyst material that appears to be just below the skin level it is still resolving is still a little indurated.    Assessment/Plan I recommend that he come back in about 4 weeks at that time if there is still a cyst there it should be more evident also if there is anything that is associated with a poor I will recommend that we excise that electively in addition he has other areas on his back that has some sebaceous cysts that she could be addressed potentially at the same time.           Aggie Bonilla MD 09/09/24 3:06 PM

## 2024-09-09 NOTE — PROGRESS NOTES
Physical Therapy    Physical Therapy Treatment/PT Discharge Summary    Patient Name: Kentrell Munguia  MRN: [id number]  Today's Date: 9/9/2024    Time Entry:   Time Calculation  Start Time: 0711  Stop Time: 0741  Time Calculation (min): 30 min     PT Therapeutic Procedures Time Entry  Therapeutic Activity Time Entry: 30    Assessment:  Patient has made good progress in PT. Patient reports no episodes of pain recently.  Objective improvements noted in posture, ROM, strength and flexibility.  All PT goals achieved.  Patient verbalizes and demonstrates good understanding and performance of HEP.  Ready for discharge to HEP.      Plan:  Discharge PT.  Continue indep with HEP as instructed and follow up with MD/PA as needed.        Current Problem  1. Lumbar spondylosis  Follow Up In Physical Therapy      2. Right hip pain        3. Arthritis of right hip        4. Tendonitis of right hip            General  General  Reason for Referral: Right hip pain M25.551; Right hip arthritis M16.11; lumbar spondylosis M47.816; right hip tendonitis M76.891  Referred By: Dr. Dion Ocasio  General Comment:  Visit 5   Insurance: MEDICARE A/B, MMO [place]PP, [place], [place] AUTH ( 0$ USED PT/OT))      Subjective    Patient reports no episodes of pain in the last several months.  Last episode was before he saw Odalis Avila PA-C. Pain currently 0/10. Intermittent twinges of LBP, but not lasting. Doing HEP about 1x/day or more depending on the exercise.  Reports he is now going up and down stairs  reciprocally without pain.  Also, now able to stand on RLE without pain.     Precautions  Precautions  STEADI Fall Risk Score (The score of 4 or more indicates an increased risk of falling): 1  Precautions Comment: h/o prostate cancer    Pain  0/10 at the start and end of session       Objective    Posture:   Improved postural alignment and awareness. No lateral trunk shift noted this date.      Lumbar AROM:   Flexion 100% no pain  Extension little to no  "restriction,  no pain  Rotation R WNL no pain  Rotation L  WNL no pain  Side Gliding hips R   WNL no pain  Side Gliding hips L WNL no pain     Repeated Test Movements:  Rep Flexion in Standing x 10 reps - no pain  Rep Extension in Standing  x 10 reps - no pain  Pretest symptoms in lying - no pain or symptoms  Rep Flexion in lying x 10 reps,  no pain  Rep Extension in lying  x 10 reps , no pain but improved ROM noted    Strength:  Hip flexion sitting R    5/5      L  5/5  Hip flexion supine R 5/5        L   455  Bridge 100%   Hip Abduction R 5/5       L 5/5  Hip Extension R 4+/5       L 4+/5  Abdominals fair  Dynamic Lumbar Stabilization fair  Knee Extension R   5/5   L 5/5  Knee Flexion R 5/5   L 5/5  Ankle DF  R 5/5     L 5/5  Ankle PF R 5/5    L 5/5     Neurological:   Sensory deficit - BLE's intact to light touch   Dural Signs negative R/L SLR      ROM  BLE's WFL and painfree     Flexibility  Hamstrings R good  L good  Piriformis R/L good  Gastrocs R/L fair    Outcome Measures:   Other Measures  Lower Extremity Funtional Score (LEFS): 72/80 (improved from 42.80)  Treatments:  Therapeutic activity  Re-eval completed this date.  See Subjective and Goal status/comments for details.     Therapeutic exercises  Reviewed previous HEP.  Added and performed the following  TA bracing 5\" x 10 reps  TA bracing with alt bent knee raise with purple tband 2 x 10 reps  Written instructions and purple theraband provided for HEP.     OP EDUCATION:  Access Code: [email or web address]  URL: [email or web address]  Date: 09/09/2024  Prepared by: Kourtney Pradhan    Exercises  - Prone Press Up On Elbows  - 1 x daily - 5-7 x weekly - 1 sets - 1 reps - 60\" hold  - Prone Press Up  - 1 x daily - 5-7 x weekly - 1-2 sets - 10 reps - 3\"  hold  - Supine Bridge with Mini Swiss Ball Between Knees  - 1 x daily - 5-7 x weekly - 3 sets - 10 reps - 3\" hold  - Hooklying Clamshell with Resistance  - 1 x daily - 5-7 x weekly - 3 sets - 10 " "reps  - Supine Transversus Abdominis Bracing - Hands on Stomach  - 1 x daily - 5-7 x weekly - 3 sets - 10 reps  - Supine March with Resistance Band  - 1 x daily - 5-7 x weekly - 3 sets - 10 reps  - Standing Lumbar Extension  - 1 x daily - 5-7 x weekly - 3 sets - 10 reps  - Hip Flexor Stretch with Chair  - 1 x daily - 5-7 x weekly - 1 sets - 3-4 reps - 25\" hold    Goals:  Resolved       PT Problem       Pain (Met)       Start:  08/05/24    Expected End:  09/30/24    Resolved:  09/09/24    Patient will report reduction of pain/ symptoms by  80%  to ease performance of  ADLs, leisure activities and work/household tasks.           Lumbar AROM (Met)       Start:  08/05/24    Expected End:  09/30/24    Resolved:  09/09/24    Patient will demonstrate full, painfree and noncompensatory spinal ROM  to ease the performance of prolonged standing and walking and ascending stairs.            Strength (Met)       Start:  08/05/24    Expected End:  09/30/24    Resolved:  09/09/24    Patient will increase trunk and LE strength by 1/3 MMT grade to facilitate endurance for standing and walking without pain.          HEP (Met)       Start:  08/05/24    Expected End:  09/30/24    Resolved:  09/09/24    Patient will demonstrate independence and compliance with safe and recommended  HEP in order to maximize and maintain functional gains made in physical therapy.          Outcome (Met)       Start:  08/05/24    Expected End:  09/30/24    Resolved:  09/09/24    Patient will improve outcome measures score on  LEFS by 20 pts  to show a clinically significant improvement  in functional mobility.          Stairs (Met)       Start:  08/05/24    Expected End:  09/30/24    Resolved:  09/09/24    Patient will report increased ease with ascending steps with right LE by 80%.           "

## 2024-09-13 ENCOUNTER — APPOINTMENT (OUTPATIENT)
Dept: PHYSICAL THERAPY | Facility: CLINIC | Age: 69
End: 2024-09-13
Payer: MEDICARE

## 2024-09-16 ENCOUNTER — APPOINTMENT (OUTPATIENT)
Dept: PHYSICAL THERAPY | Facility: CLINIC | Age: 69
End: 2024-09-16
Payer: MEDICARE

## 2024-09-16 ENCOUNTER — LAB (OUTPATIENT)
Dept: LAB | Facility: LAB | Age: 69
End: 2024-09-16
Payer: MEDICARE

## 2024-09-16 DIAGNOSIS — C61 PROSTATE CANCER (MULTI): ICD-10-CM

## 2024-09-16 DIAGNOSIS — R39.9 LOWER URINARY TRACT SYMPTOMS (LUTS): ICD-10-CM

## 2024-09-16 PROCEDURE — 87086 URINE CULTURE/COLONY COUNT: CPT

## 2024-09-17 LAB — BACTERIA UR CULT: NO GROWTH

## 2024-09-20 ENCOUNTER — APPOINTMENT (OUTPATIENT)
Dept: PHYSICAL THERAPY | Facility: CLINIC | Age: 69
End: 2024-09-20
Payer: MEDICARE

## 2024-09-27 ENCOUNTER — APPOINTMENT (OUTPATIENT)
Dept: UROLOGY | Facility: CLINIC | Age: 69
End: 2024-09-27
Payer: MEDICARE

## 2024-09-30 ENCOUNTER — APPOINTMENT (OUTPATIENT)
Dept: UROLOGY | Facility: CLINIC | Age: 69
End: 2024-09-30
Payer: MEDICARE

## 2024-09-30 DIAGNOSIS — Z79.2 PROPHYLACTIC ANTIBIOTIC: ICD-10-CM

## 2024-09-30 DIAGNOSIS — C61 PROSTATE CANCER (MULTI): ICD-10-CM

## 2024-09-30 LAB
POC APPEARANCE, URINE: CLEAR
POC BILIRUBIN, URINE: NEGATIVE
POC BLOOD, URINE: ABNORMAL
POC COLOR, URINE: YELLOW
POC GLUCOSE, URINE: NEGATIVE MG/DL
POC KETONES, URINE: NEGATIVE MG/DL
POC LEUKOCYTES, URINE: NEGATIVE
POC NITRITE,URINE: NEGATIVE
POC PH, URINE: 6.5 PH
POC PROTEIN, URINE: NEGATIVE MG/DL
POC SPECIFIC GRAVITY, URINE: 1.02
POC UROBILINOGEN, URINE: 0.2 EU/DL

## 2024-09-30 PROCEDURE — 76942 ECHO GUIDE FOR BIOPSY: CPT | Performed by: STUDENT IN AN ORGANIZED HEALTH CARE EDUCATION/TRAINING PROGRAM

## 2024-09-30 PROCEDURE — 55700 PR PROSTATE NEEDLE BIOPSY ANY APPROACH: CPT | Performed by: STUDENT IN AN ORGANIZED HEALTH CARE EDUCATION/TRAINING PROGRAM

## 2024-09-30 PROCEDURE — 96372 THER/PROPH/DIAG INJ SC/IM: CPT | Performed by: STUDENT IN AN ORGANIZED HEALTH CARE EDUCATION/TRAINING PROGRAM

## 2024-09-30 PROCEDURE — 76872 US TRANSRECTAL: CPT | Performed by: STUDENT IN AN ORGANIZED HEALTH CARE EDUCATION/TRAINING PROGRAM

## 2024-09-30 RX ORDER — AMIKACIN SULFATE 250 MG/ML
9.2 INJECTION, SOLUTION INTRAMUSCULAR; INTRAVENOUS ONCE
Status: COMPLETED | OUTPATIENT
Start: 2024-09-30 | End: 2024-09-30

## 2024-09-30 NOTE — PROGRESS NOTES
Patient ID: Kentrell Munguia is a 68 y.o. male.    Procedures  1. TRANSRECTAL ULTRASOUND-GUIDED BIOPSY OF PROSTATE        Patient was consent and antibiotics were administered on-call to the OR.  In the operating room, patient in lateral position, the transrectal ultrasound probe was inserted, and mapping of the prostate was performed.  Local anesthetic was administered in Denonvillier's fascia.  17 systematic core biopsies were obtained.  Patient tolerated procedure well, there was minimal bleeding, and he was transferred to PACU in stable condition.        Subjective   Patient ID: Kentrell Munguia is a 68 y.o. male.    HPI  Kentrell Munguia is a 68 y.o. male referred by Dr. Bennett for a biopsy.     Patient has a history of GG1 prostate cancer. Hx of CKD stage 3, HTN, DAVID, CAD. S/p prostate biopsy (9/15/20) with pathology showing prostatic adenocarcinoma, GG1 (Bernadette score 3+3=6), 1/2 cores (5% of tissue). MRI Prostate (8/16/24) showed 63.8g, BPH changes of the TZ, diffuse non nodular hypointensities within the PZ, without evidence of focally restricted diffusion (PI-RADS 2).       PSA: 14.18 (6/24/24)    MRI Prostate (8/16/24): 63.8g, BPH changes of the TZ, diffuse non nodular hypointensities within the PZ, without evidence of focally restricted diffusion (PI-RADS 2), T1 hyperintense lesion in the left femoral neck, unchanged from prior, nonspecific on the current exam.    Review of Systems  A complete review of systems was performed. All systems are noted to be negative unless indicated in the history of present illness, impression, active problem list, or past histories.      Objective   Physical Exam    Assessment/Plan   Diagnoses and all orders for this visit:  Prostate cancer (Multi)  -     Surgical Pathology Exam    Kentrell Munguia is a 68 y.o. male referred by Dr. Bennett for a TRUS biopsy of the prostate as part of the active surveillance protocol.     Patient has a history of GG1 prostate cancer. Hx of CKD  stage 3, HTN, DAVID, CAD. S/p prostate biopsy (9/15/20) with pathology showing prostatic adenocarcinoma, GG1 (Bernadette score 3+3=6), 1/2 cores (5% of tissue). MRI Prostate (8/16/24) showed 63.8g, BPH changes of the TZ, diffuse non nodular hypointensities within the PZ, without evidence of focally restricted diffusion (PI-RADS 2).     His most recent PSA was 14.18 on 06/24/24.        Scribe Attestation  By signing my name below, I, Dylan Iverson   attest that this documentation has been prepared under the direction and in the presence of Simon Sandy MD.

## 2024-10-09 LAB
LAB AP ASR DISCLAIMER: NORMAL
LABORATORY COMMENT REPORT: NORMAL
PATH REPORT.FINAL DX SPEC: NORMAL
PATH REPORT.GROSS SPEC: NORMAL
PATH REPORT.RELEVANT HX SPEC: NORMAL
PATH REPORT.TOTAL CANCER: NORMAL

## 2024-10-10 ENCOUNTER — APPOINTMENT (OUTPATIENT)
Dept: SURGERY | Facility: CLINIC | Age: 69
End: 2024-10-10
Payer: MEDICARE

## 2024-10-11 ENCOUNTER — APPOINTMENT (OUTPATIENT)
Dept: UROLOGY | Facility: CLINIC | Age: 69
End: 2024-10-11
Payer: MEDICARE

## 2024-10-11 DIAGNOSIS — C61 MALIGNANT NEOPLASM OF PROSTATE (MULTI): Primary | ICD-10-CM

## 2024-10-11 PROCEDURE — 99213 OFFICE O/P EST LOW 20 MIN: CPT | Performed by: STUDENT IN AN ORGANIZED HEALTH CARE EDUCATION/TRAINING PROGRAM

## 2024-10-11 PROCEDURE — G2211 COMPLEX E/M VISIT ADD ON: HCPCS | Performed by: STUDENT IN AN ORGANIZED HEALTH CARE EDUCATION/TRAINING PROGRAM

## 2024-10-11 NOTE — PROGRESS NOTES
Subjective   Patient ID: Kentrell Munguia is a 68 y.o. male.    HPI  Kentrell Munguia is a 68 y.o. male referred by Dr. Bennett for a follow up after biopsy.    Patient has a history of GG1 prostate cancer. Hx of CKD stage 3, HTN, DAVID, CAD. S/p prostate biopsy (9/15/20) with pathology showing prostatic adenocarcinoma, GG1 (Edison score 3+3=6), 1/2 cores (5% of tissue). MRI Prostate (8/16/24) showed 63.8g, BPH changes of the TZ, diffuse non nodular hypointensities within the PZ, without evidence of focally restricted diffusion (PI-RADS 2).     PSA: 14.18 (6/24/24)  Lab Results   Component Value Date    PSA 6.8 (H) 11/15/2021        MRI Prostate (8/16/24): 63.8g, BPH changes of the TZ, diffuse non nodular hypointensities within the PZ, without evidence of focally restricted diffusion (PI-RADS 2), T1 hyperintense lesion in the left femoral neck, unchanged from prior, nonspecific on the current exam.    FINAL DIAGNOSIS   A.  PROSTATE, LEFT APEX, NEEDLE BIOPSIES:  --ADENOCARCINOMA, SHALONDA PATTERN 3 + 3 = 6, GRADE GROUP 1, INVOLVING 2 OF 3 CORES, AND APPROXIMATELY 8% OF THE SPECIMEN. SEE NOTE.      Note: Immunostain for PIN-4 confirms the absence of basal cells in the malignant acini.     B., C.  PROSTATE, LEFT MID, LEFT BASE, NEEDLE BIOPSIES:  --PROSTATE TISSUE WITH NO EVIDENCE OF MALIGNANCY.     D.  PROSTATE, RIGHT APEX, NEEDLE BIOPSIES:  --FOCUS OF ATYPICAL SMALL ACINAR PROLIFERATION, SUSPICIOUS FOR BUT NOT DIAGNOSTIC OF MALIGNANCY.     E., F.  PROSTATE, RIGHT MID, RIGHT BASE, NEEDLE BIOPSIES:  --PROSTATE TISSUE WITH NO EVIDENCE OF MALIGNANCY.       Review of Systems  A complete review of systems was performed. All systems are noted to be negative unless indicated in the history of present illness, impression, active problem list, or past histories.      Objective   Physical Exam    Assessment/Plan   Diagnoses and all orders for this visit:  Malignant neoplasm of prostate (Multi)      Kentrell Munguia is a 68 y.o. male  referred by Dr. Bennett for a TRUS biopsy of the prostate as part of the active surveillance protocol.     Patient has a history of GG1 prostate cancer. Hx of CKD stage 3, HTN, DAVID, CAD. S/p prostate biopsy (9/15/20) with pathology showing prostatic adenocarcinoma, GG1 (Bernadette score 3+3=6), 1/2 cores (5% of tissue). MRI Prostate (8/16/24) showed 63.8g, BPH changes of the TZ, diffuse non nodular hypointensities within the PZ, without evidence of focally restricted diffusion (PI-RADS 2).     His most recent PSA was 14.18 on 06/24/24.    Plan:  Continue active surveillance  Repeat PSA end of Jan, 2025  Follow up in Feb, 2025.    Scribe Attestation  By signing my name below, Jenny HERNANDEZ Scribe   attest that this documentation has been prepared under the direction and in the presence of Simon Sandy MD.

## 2024-10-25 DIAGNOSIS — E78.5 HYPERLIPIDEMIA, UNSPECIFIED: ICD-10-CM

## 2024-10-25 RX ORDER — ROSUVASTATIN CALCIUM 40 MG/1
40 TABLET, COATED ORAL DAILY
Qty: 90 TABLET | Refills: 3 | Status: SHIPPED | OUTPATIENT
Start: 2024-10-25

## 2024-10-30 ENCOUNTER — APPOINTMENT (OUTPATIENT)
Dept: UROLOGY | Facility: CLINIC | Age: 69
End: 2024-10-30
Payer: MEDICARE

## 2024-11-18 ENCOUNTER — OFFICE VISIT (OUTPATIENT)
Dept: PHYSICAL MEDICINE AND REHAB | Facility: CLINIC | Age: 69
End: 2024-11-18
Payer: MEDICARE

## 2024-11-18 VITALS
TEMPERATURE: 98.1 F | DIASTOLIC BLOOD PRESSURE: 80 MMHG | SYSTOLIC BLOOD PRESSURE: 121 MMHG | RESPIRATION RATE: 18 BRPM | HEART RATE: 60 BPM

## 2024-11-18 DIAGNOSIS — M25.551 RIGHT HIP PAIN: ICD-10-CM

## 2024-11-18 DIAGNOSIS — M16.11 ARTHRITIS OF RIGHT HIP: Primary | ICD-10-CM

## 2024-11-18 DIAGNOSIS — M76.891 TENDONITIS OF RIGHT HIP: ICD-10-CM

## 2024-11-18 PROCEDURE — 1159F MED LIST DOCD IN RCRD: CPT | Performed by: PHYSICAL MEDICINE & REHABILITATION

## 2024-11-18 PROCEDURE — 3074F SYST BP LT 130 MM HG: CPT | Performed by: PHYSICAL MEDICINE & REHABILITATION

## 2024-11-18 PROCEDURE — 3079F DIAST BP 80-89 MM HG: CPT | Performed by: PHYSICAL MEDICINE & REHABILITATION

## 2024-11-18 PROCEDURE — 4004F PT TOBACCO SCREEN RCVD TLK: CPT | Performed by: PHYSICAL MEDICINE & REHABILITATION

## 2024-11-18 PROCEDURE — 99212 OFFICE O/P EST SF 10 MIN: CPT | Performed by: PHYSICAL MEDICINE & REHABILITATION

## 2024-11-18 PROCEDURE — 1126F AMNT PAIN NOTED NONE PRSNT: CPT | Performed by: PHYSICAL MEDICINE & REHABILITATION

## 2024-11-18 ASSESSMENT — PAIN SCALES - GENERAL: PAINLEVEL_OUTOF10: 0-NO PAIN

## 2024-11-18 NOTE — PROGRESS NOTES
Physical Medicine and Rehabilitation [facility]   11/18/24       Chief Complaint:  Low back pain     HPI:  Kentrell Munguia is a  68 y.o. M who presents to the clinic today  for evaluation of R hip pain.    recall  Onset: 1.5 years ago no trauma  When sitting for a while and then sometimes gives out  Feels anterior thigh pain and sometimes can't put any weight on it  Feels like something is tweaked above the groin  Also pain in the right lower back  Sometimes twisting  Location: as above  Radiation: as above  Quality: feels sharp  Duration: comes and goes  Aggravating factors:  pressure and walking, getting in and out of the car  Alleviating factors: sitting and sleeping  Severity: 0  Numbness/Tingling: No  Bowel or bladder incontinence: No  Pt's current medication regimen includes:      Treatment to date:  Physical therapy: No  Medications taken to date for this complaint include the following:   none  Prior injections: No       No falls, but close calls.           Has not had any pain episode. Had two sessions of therapy.   Not interested in meds. Not interested in meds.      1, xr l spine showed spondylosis L1/2, L4/5, L5/s1  2. Pt for R hip and L spine; continue   3. Not interested in meds  4. Can consider referral for diagnostic and therapeutic steroid injections R hip, wants to hold off likely pain is related to hip    He does Hep. He does standing exercises during work., feeling well. No symptoms. Not taking meds.      Imaging  Reviewed 11/18/24 w patient and personally  Xr L spine 6/2024  FINDINGS:  Alignment:  No significant scoliosis. Mild-to-moderate spondylosis.  This includes moderate marginal osteophyte formation, greater at the  L1-2 level including left laterally. Moderate narrowing of the L4-5  disc interspace with a retrolisthesis. Apophyseal hypertrophy  particularly at L4-5 and L5-S1 levels.      Disc levels:  Maintained      Bony structures:  Intact      Other findings:  None significant       IMPRESSION:  Spondylosis as described.    Xr pelvis 7/2023  TECHNIQUE:  AP pelvis and  AP and frogleg lateral  Left hip radiographs.     FINDINGS:  No fracture or dislocation is evident.There is moderate right and  mild-to-moderate left hip degenerative change. There is subtle  appreciation of mottled increased density in the inter trochanteric  left femur the correlates with the findings seen on the prior CT.      Impression   1. Subtle appreciation structure in the intertrochanteric left femur  with similar differential considerations as discussed on the prior  CT. Follow-up can be considered as clinically warranted.  2. Degenerative change as above.       No past medical history on file.     Past Surgical History:   Procedure Laterality Date    OTHER SURGICAL HISTORY  07/07/2020    Vasectomy        Patient Active Problem List    Diagnosis Date Noted    Arthritis of right hip 08/30/2024    Tendonitis of right hip 08/30/2024    Stage 3a chronic kidney disease (Multi) 07/09/2024    Abscess of back 07/09/2024    Nocturia 06/24/2024    Right hip pain 06/24/2024    Cutaneous abscess of back excluding buttocks 06/24/2024    Tubular adenoma of colon 12/18/2023    Smoking addiction 12/18/2023    Benign essential hypertension 06/16/2023    Chronic pain of both shoulders 06/16/2023    Dependence on nicotine from cigarettes 06/16/2023    Dyslipidemia 06/16/2023    Interstitial lung disease (Multi) 06/16/2023    Mild vitamin D deficiency 06/16/2023    Multiple pulmonary nodules 06/16/2023    DAVID (obstructive sleep apnea) 06/16/2023    Prostate cancer (Multi) 06/16/2023    Tubulovillous adenoma of colon 06/16/2023    Coronary artery disease due to calcified coronary lesion 06/16/2023    Encounter for screening for malignant neoplasm of colon 06/16/2023    Bone lesion 06/16/2023    Prediabetes 06/16/2023    BMI 33.0-33.9,adult 06/16/2023        Family History   Problem Relation Name Age of Onset    Heart attack Mother           Current Outpatient Medications   Medication Sig Dispense Refill    aspirin 81 mg EC tablet Take 1 tablet (81 mg) by mouth once daily.      doxazosin (Cardura) 4 mg tablet Take 1 tablet (4 mg) by mouth once daily at bedtime. 90 tablet 3    losartan-hydrochlorothiazide (Hyzaar) 100-25 mg tablet Take 1 tablet by mouth once daily. 30 tablet 11    rosuvastatin (Crestor) 40 mg tablet TAKE 1 TABLET BY MOUTH ONCE DAILY 90 tablet 3    ciprofloxacin (Cipro) 500 mg tablet Take 1 pill the night before the procedure, 1 pill the morning of the procedure, and 1 pill the night of the procedure. 3 tablet 0     No current facility-administered medications for this visit.        No Known Allergies     Social History     Socioeconomic History    Marital status:      Spouse name: Sabina    Number of children: 3   Occupational History    Occupation: retired/ [employer]   Tobacco Use    Smoking status: Former     Average packs/day: 0.5 packs/day for 54.0 years (27.0 ttl pk-yrs)     Types: Cigarettes     Start date: 1970    Smokeless tobacco: Never   Vaping Use    Vaping status: Never Used   Substance and Sexual Activity    Alcohol use: Yes     Alcohol/week: 8.0 standard drinks of alcohol     Types: 8 Shots of liquor per week    Drug use: Yes     Types: Marijuana    Sexual activity: Not Currently     Partners: Female   Htn  Hlp  BPH    Retired but works at wood shop  Lives w his wife  Quit smoking 6 months  Htc and 4 drinks a week       Review of Systems:  Constitutional:  Denies fever or chills, malaise, weight changes.   Eyes:  Denies change in visual acuity   HENT:  Denies nasal congestion or sore throat   Respiratory:  Denies cough or shortness of breath   Cardiovascular:  Denies chest pain or edema   GI:  Denies abdominal pain, nausea, vomiting, bloody stools or diarrhea   :  Denies dysuria   Integument:  Denies rash   Neurologic:  As per HPI  MSK: Per above HPI  Endocrine:  Denies polyuria or polydipsia   Lymphatic:  Denies  swollen glands   Psychiatric:  Denies depression or anxiety            PHYSICAL EXAM:  /80 (BP Location: Left arm, Patient Position: Sitting)   Pulse 60   Temp 36.7 °C (98.1 °F)   Resp 18       General:  NAD, well developed, M      Psychiatric: appropriate mood & affect.   Cardiovascular:  Normal pedal pulses; no LE edema.  Respiratory:  Normal rate; unlabored breathing.  Skin:  Intact; no erythema; no ecchymosis or rash.  Lymphatic:  No lymphadenopathy or lymphedema.  NEURO:  Alert and appropriate. Speech fluent, conversing appropriately.   Motor:    Rt: HF 5/5, KE 5/5, KF 5/5, DF 5/5, EHL 5/5, PF 5/5.    Lt: HF 5/5, KE 5/5, KF 5/5, DF 5/5, EHL 5/5, PF 5/5.  Sensation:     Light touch: intact in the b/l L3-S1 dermatomes.    Reflexes:     Right:  patellar 2+, achilles 2+,     Left: patellar 2+, achilles 2+,     Babinski's downgoing b/l; no clonus  Gait: Normal, narrow based gait.     MSK:  Inspection reveals no evidence of a pelvic obliqity   Spinal range of motion: Flexion to 80° degrees, Extension of 10 degrees.  There is tenderness over the R anterior thigh and R lateral thigh.   Special tests:    Straight leg raise: R lumbar ttp    Slump test:    Facet loading: + R   Pain w internal rotation mild  Slightly antalgic gait         Impression: Kentrell Munguia is a 68 y.o. M w pmh of htn, hlp presenting with R groin pain and R lower back due to R hip arthritis and likely spondylosis. Now presenting w good improvement after time and therapy.    Plan:   1, xr l spine showed spondylosis L1/2, L4/5, L5/s1  2. Continue HEP  3. Not interested in meds    Fu 8 weeks          Jyoti Ocasio MD  Physical Medicine and Rehabilitation

## 2024-12-09 ENCOUNTER — APPOINTMENT (OUTPATIENT)
Dept: SURGERY | Facility: CLINIC | Age: 69
End: 2024-12-09
Payer: MEDICARE

## 2024-12-09 VITALS
DIASTOLIC BLOOD PRESSURE: 68 MMHG | TEMPERATURE: 97.1 F | BODY MASS INDEX: 36.53 KG/M2 | WEIGHT: 241 LBS | HEIGHT: 68 IN | OXYGEN SATURATION: 93 % | HEART RATE: 62 BPM | SYSTOLIC BLOOD PRESSURE: 110 MMHG

## 2024-12-09 DIAGNOSIS — L72.3 SEBACEOUS CYST: Primary | ICD-10-CM

## 2024-12-09 PROCEDURE — 1159F MED LIST DOCD IN RCRD: CPT | Performed by: SURGERY

## 2024-12-09 PROCEDURE — 99213 OFFICE O/P EST LOW 20 MIN: CPT | Performed by: SURGERY

## 2024-12-09 PROCEDURE — 3078F DIAST BP <80 MM HG: CPT | Performed by: SURGERY

## 2024-12-09 PROCEDURE — 3074F SYST BP LT 130 MM HG: CPT | Performed by: SURGERY

## 2024-12-09 PROCEDURE — 3008F BODY MASS INDEX DOCD: CPT | Performed by: SURGERY

## 2024-12-09 NOTE — PROGRESS NOTES
Subjective   Patient ID: Kentrell Munguia is a 69 y.o. male who presents for Follow-up (FUV neck abscess ).  HPI this is a pleasant gentleman that I took care of a few months ago he had a large sebaceous cyst that was infected on his back it is now healed but there is still some residual cyst material under the skin and I would recommend complete excision.  This is close to the midline but a little to the patient's left also little more cephalad he has a tiny sebaceous cyst as well that we could address at the same time.    Review of Systems 10 point review is otherwise negative    Objective   Physical Exam head is normocephalic atraumatic eyes extraocular movements are intact pupils are equal and round lungs are clear heart is regular rate and rhythm examination of the back reveals this area with 2 open spots that have a some sebaceous material and there feels as though there is a still a cyst underneath the skin as well.  I am recommending excision of this and also a tiny area that is a little more cephalad.    Assessment/Plan I discussed excision with the patient and I would recommend this so that it does not become infected again he is in full agreement and this will be done in January.           Aggie Bonilla MD 12/09/24 4:37 PM

## 2024-12-13 ENCOUNTER — HOSPITAL ENCOUNTER (OUTPATIENT)
Dept: RADIOLOGY | Facility: HOSPITAL | Age: 69
Discharge: HOME | End: 2024-12-13
Payer: MEDICARE

## 2024-12-13 DIAGNOSIS — F17.200 SMOKING ADDICTION: ICD-10-CM

## 2024-12-13 DIAGNOSIS — F17.210 CIGARETTE SMOKER: ICD-10-CM

## 2024-12-13 PROCEDURE — 71271 CT THORAX LUNG CANCER SCR C-: CPT

## 2024-12-23 ENCOUNTER — APPOINTMENT (OUTPATIENT)
Dept: PRIMARY CARE | Facility: CLINIC | Age: 69
End: 2024-12-23
Payer: MEDICARE

## 2024-12-23 ENCOUNTER — OFFICE VISIT (OUTPATIENT)
Dept: PULMONOLOGY | Facility: CLINIC | Age: 69
End: 2024-12-23
Payer: MEDICARE

## 2024-12-23 VITALS
DIASTOLIC BLOOD PRESSURE: 80 MMHG | BODY MASS INDEX: 36.07 KG/M2 | HEIGHT: 68 IN | SYSTOLIC BLOOD PRESSURE: 116 MMHG | TEMPERATURE: 97.2 F | WEIGHT: 238 LBS

## 2024-12-23 VITALS
DIASTOLIC BLOOD PRESSURE: 83 MMHG | WEIGHT: 239.3 LBS | OXYGEN SATURATION: 97 % | SYSTOLIC BLOOD PRESSURE: 123 MMHG | BODY MASS INDEX: 36.39 KG/M2 | HEART RATE: 89 BPM | RESPIRATION RATE: 16 BRPM

## 2024-12-23 DIAGNOSIS — C61 PROSTATE CANCER (MULTI): ICD-10-CM

## 2024-12-23 DIAGNOSIS — R91.8 LUNG NODULES: Primary | ICD-10-CM

## 2024-12-23 DIAGNOSIS — R73.03 PREDIABETES: Primary | ICD-10-CM

## 2024-12-23 DIAGNOSIS — I10 BENIGN ESSENTIAL HYPERTENSION: ICD-10-CM

## 2024-12-23 DIAGNOSIS — E78.5 DYSLIPIDEMIA: ICD-10-CM

## 2024-12-23 DIAGNOSIS — E55.9 MILD VITAMIN D DEFICIENCY: ICD-10-CM

## 2024-12-23 DIAGNOSIS — Z87.891 EX-CIGARETTE SMOKER: ICD-10-CM

## 2024-12-23 DIAGNOSIS — N18.31 STAGE 3A CHRONIC KIDNEY DISEASE (MULTI): ICD-10-CM

## 2024-12-23 DIAGNOSIS — E66.01 OBESITY, MORBID (MULTI): ICD-10-CM

## 2024-12-23 DIAGNOSIS — M16.11 ARTHRITIS OF RIGHT HIP: ICD-10-CM

## 2024-12-23 PROBLEM — L02.212 ABSCESS OF BACK: Status: RESOLVED | Noted: 2024-07-09 | Resolved: 2024-12-23

## 2024-12-23 PROBLEM — F17.200 SMOKING ADDICTION: Status: RESOLVED | Noted: 2023-12-18 | Resolved: 2024-12-23

## 2024-12-23 PROBLEM — L02.212 CUTANEOUS ABSCESS OF BACK EXCLUDING BUTTOCKS: Status: RESOLVED | Noted: 2024-06-24 | Resolved: 2024-12-23

## 2024-12-23 PROBLEM — F17.210 DEPENDENCE ON NICOTINE FROM CIGARETTES: Status: RESOLVED | Noted: 2023-06-16 | Resolved: 2024-12-23

## 2024-12-23 LAB
25(OH)D3 SERPL-MCNC: 24 NG/ML (ref 30–100)
ALT SERPL W P-5'-P-CCNC: 31 U/L (ref 14–59)
ANION GAP SERPL CALC-SCNC: 13 MMOL/L (ref 10–20)
AST SERPL W P-5'-P-CCNC: 23 U/L (ref 15–37)
BASOPHILS # BLD AUTO: 0.01 X10*3/UL (ref 0.1–1.6)
BASOPHILS NFR BLD AUTO: 0.3 % (ref 0–0.3)
BUN SERPL-MCNC: 17 MG/DL (ref 7–18)
CALCIUM SERPL-MCNC: 9.5 MG/DL (ref 8.5–10.1)
CHLORIDE SERPL-SCNC: 100 MMOL/L (ref 98–107)
CHOLEST SERPL-MCNC: 142 MG/DL (ref 0–199)
CHOLESTEROL/HDL RATIO: 1.9 (ref 4.2–7)
CO2 SERPL-SCNC: 30 MMOL/L (ref 21–32)
CREAT SERPL-MCNC: 1.25 MG/DL (ref 0.6–1.1)
EGFRCR SERPLBLD CKD-EPI 2021: 63 ML/MIN/1.73M*2
EOSINOPHIL # BLD AUTO: 0.08 X10*3/UL (ref 0.04–0.5)
EOSINOPHIL NFR BLD AUTO: 1.64 % (ref 0.7–7)
ERYTHROCYTE [DISTWIDTH] IN BLOOD BY AUTOMATED COUNT: 15.2 % (ref 11.5–14.5)
GLUCOSE SERPL-MCNC: 118 MG/DL (ref 74–100)
HBA1C MFR BLD: 5.9 %
HCT VFR BLD AUTO: 50.5 % (ref 38.4–51.3)
HDLC SERPL-MCNC: 74 MG/DL (ref 40–59)
HGB BLD-MCNC: 16.71 G/DL (ref 12.7–17)
IS PATIENT FASTING: YES
LDLC SERPL DIRECT ASSAY-MCNC: 51 MG/DL (ref 0–100)
LYMPHOCYTES # BLD AUTO: 1.03 X10*3/UL (ref 0–6)
LYMPHOCYTES NFR BLD AUTO: 21.63 % (ref 20.5–51.1)
MCH RBC QN AUTO: 30.2 PG (ref 26–32)
MCHC RBC AUTO-ENTMCNC: 33.1 G/DL (ref 31–38)
MCV RBC AUTO: 91.1 FL (ref 80–96)
MONOCYTES # BLD AUTO: 0.52 X10*3/UL (ref 1.6–24.9)
MONOCYTES NFR BLD AUTO: 10.86 % (ref 1.7–9.3)
NEUTROPHILS # BLD AUTO: 3.11 X10*3/UL (ref 1.4–6.5)
NEUTROPHILS NFR BLD AUTO: 65.57 % (ref 42.2–75.2)
PLATELET # BLD AUTO: 193 X10*3/UL (ref 150–450)
PMV BLD AUTO: 9.59 FL (ref 7.8–11)
POTASSIUM SERPL-SCNC: 5.1 MMOL/L (ref 3.5–5.1)
PSA SERPL-MCNC: 8.79 NG/ML
RBC # BLD AUTO: 5.54 X10*6/UL (ref 4.1–5.6)
SODIUM SERPL-SCNC: 138 MMOL/L (ref 136–145)
TRIGL SERPL-MCNC: 66 MG/DL
TSH SERPL-ACNC: 1.47 MIU/L (ref 0.44–3.98)
WBC # BLD AUTO: 4.75 X10*3/UL (ref 4.5–10.5)

## 2024-12-23 PROCEDURE — 4004F PT TOBACCO SCREEN RCVD TLK: CPT | Performed by: INTERNAL MEDICINE

## 2024-12-23 PROCEDURE — 3074F SYST BP LT 130 MM HG: CPT | Performed by: PHYSICIAN ASSISTANT

## 2024-12-23 PROCEDURE — 3079F DIAST BP 80-89 MM HG: CPT | Performed by: PHYSICIAN ASSISTANT

## 2024-12-23 PROCEDURE — 99214 OFFICE O/P EST MOD 30 MIN: CPT | Performed by: PHYSICIAN ASSISTANT

## 2024-12-23 PROCEDURE — 80048 BASIC METABOLIC PNL TOTAL CA: CPT | Performed by: PHYSICIAN ASSISTANT

## 2024-12-23 PROCEDURE — 84153 ASSAY OF PSA TOTAL: CPT | Performed by: PHYSICIAN ASSISTANT

## 2024-12-23 PROCEDURE — 1160F RVW MEDS BY RX/DR IN RCRD: CPT | Performed by: PHYSICIAN ASSISTANT

## 2024-12-23 PROCEDURE — 1159F MED LIST DOCD IN RCRD: CPT | Performed by: INTERNAL MEDICINE

## 2024-12-23 PROCEDURE — 4004F PT TOBACCO SCREEN RCVD TLK: CPT | Performed by: PHYSICIAN ASSISTANT

## 2024-12-23 PROCEDURE — G2211 COMPLEX E/M VISIT ADD ON: HCPCS | Performed by: PHYSICIAN ASSISTANT

## 2024-12-23 PROCEDURE — 1158F ADVNC CARE PLAN TLK DOCD: CPT | Performed by: PHYSICIAN ASSISTANT

## 2024-12-23 PROCEDURE — 84443 ASSAY THYROID STIM HORMONE: CPT | Performed by: PHYSICIAN ASSISTANT

## 2024-12-23 PROCEDURE — 3079F DIAST BP 80-89 MM HG: CPT | Performed by: INTERNAL MEDICINE

## 2024-12-23 PROCEDURE — 1126F AMNT PAIN NOTED NONE PRSNT: CPT | Performed by: INTERNAL MEDICINE

## 2024-12-23 PROCEDURE — 80061 LIPID PANEL: CPT | Performed by: PHYSICIAN ASSISTANT

## 2024-12-23 PROCEDURE — 83036 HEMOGLOBIN GLYCOSYLATED A1C: CPT | Performed by: PHYSICIAN ASSISTANT

## 2024-12-23 PROCEDURE — 1126F AMNT PAIN NOTED NONE PRSNT: CPT | Performed by: PHYSICIAN ASSISTANT

## 2024-12-23 PROCEDURE — 1123F ACP DISCUSS/DSCN MKR DOCD: CPT | Performed by: PHYSICIAN ASSISTANT

## 2024-12-23 PROCEDURE — 3008F BODY MASS INDEX DOCD: CPT | Performed by: PHYSICIAN ASSISTANT

## 2024-12-23 PROCEDURE — 82306 VITAMIN D 25 HYDROXY: CPT | Performed by: PHYSICIAN ASSISTANT

## 2024-12-23 PROCEDURE — 85025 COMPLETE CBC W/AUTO DIFF WBC: CPT | Performed by: PHYSICIAN ASSISTANT

## 2024-12-23 PROCEDURE — 99213 OFFICE O/P EST LOW 20 MIN: CPT | Performed by: INTERNAL MEDICINE

## 2024-12-23 PROCEDURE — 84450 TRANSFERASE (AST) (SGOT): CPT | Performed by: PHYSICIAN ASSISTANT

## 2024-12-23 PROCEDURE — 1159F MED LIST DOCD IN RCRD: CPT | Performed by: PHYSICIAN ASSISTANT

## 2024-12-23 PROCEDURE — 84460 ALANINE AMINO (ALT) (SGPT): CPT | Performed by: PHYSICIAN ASSISTANT

## 2024-12-23 PROCEDURE — 3074F SYST BP LT 130 MM HG: CPT | Performed by: INTERNAL MEDICINE

## 2024-12-23 ASSESSMENT — ENCOUNTER SYMPTOMS
POLYPHAGIA: 0
NUMBNESS: 0
OCCASIONAL FEELINGS OF UNSTEADINESS: 0
PALPITATIONS: 0
VOMITING: 0
CHILLS: 0
COLOR CHANGE: 0
SLEEP DISTURBANCE: 0
FATIGUE: 0
FREQUENCY: 0
CONSTIPATION: 0
WHEEZING: 0
DIZZINESS: 0
CONFUSION: 0
ARTHRALGIAS: 0
CHOKING: 0
CHEST TIGHTNESS: 0
POLYDIPSIA: 0
DEPRESSION: 0
MYALGIAS: 0
DIARRHEA: 0
SORE THROAT: 0
ANAL BLEEDING: 0
EYE PAIN: 0
WEAKNESS: 0
DIFFICULTY URINATING: 0
FACIAL SWELLING: 0
LOSS OF SENSATION IN FEET: 0
APPETITE CHANGE: 0
ABDOMINAL PAIN: 0
NAUSEA: 0
TREMORS: 0
HEMATURIA: 0
SHORTNESS OF BREATH: 0
HEADACHES: 0
ABDOMINAL DISTENTION: 0
EYE DISCHARGE: 0
NERVOUS/ANXIOUS: 0
COUGH: 0
FEVER: 0
JOINT SWELLING: 0

## 2024-12-23 ASSESSMENT — COLUMBIA-SUICIDE SEVERITY RATING SCALE - C-SSRS
6. HAVE YOU EVER DONE ANYTHING, STARTED TO DO ANYTHING, OR PREPARED TO DO ANYTHING TO END YOUR LIFE?: NO
1. IN THE PAST MONTH, HAVE YOU WISHED YOU WERE DEAD OR WISHED YOU COULD GO TO SLEEP AND NOT WAKE UP?: NO
2. HAVE YOU ACTUALLY HAD ANY THOUGHTS OF KILLING YOURSELF?: NO
6. HAVE YOU EVER DONE ANYTHING, STARTED TO DO ANYTHING, OR PREPARED TO DO ANYTHING TO END YOUR LIFE?: NO
1. IN THE PAST MONTH, HAVE YOU WISHED YOU WERE DEAD OR WISHED YOU COULD GO TO SLEEP AND NOT WAKE UP?: NO
2. HAVE YOU ACTUALLY HAD ANY THOUGHTS OF KILLING YOURSELF?: NO

## 2024-12-23 ASSESSMENT — PATIENT HEALTH QUESTIONNAIRE - PHQ9
1. LITTLE INTEREST OR PLEASURE IN DOING THINGS: NOT AT ALL
SUM OF ALL RESPONSES TO PHQ9 QUESTIONS 1 AND 2: 0
2. FEELING DOWN, DEPRESSED OR HOPELESS: NOT AT ALL
1. LITTLE INTEREST OR PLEASURE IN DOING THINGS: NOT AT ALL
2. FEELING DOWN, DEPRESSED OR HOPELESS: NOT AT ALL
SUM OF ALL RESPONSES TO PHQ9 QUESTIONS 1 AND 2: 0

## 2024-12-23 ASSESSMENT — PAIN SCALES - GENERAL
PAINLEVEL_OUTOF10: 0-NO PAIN
PAINLEVEL_OUTOF10: 0-NO PAIN

## 2024-12-23 NOTE — PROGRESS NOTES
"Subjective   Patient ID: Kentrell Munguia is a 69 y.o. male with a history of CAD, hypertension, DAVID, pulmonary nodules, former cigarette smoker, CKD, hyperlipidemia, prediabetes, prostate cancer and colon polyps who presents for Follow-up    HPI the patient is presented for follow-up.  His blood pressure is well-controlled.  He denies shortness of breath, chest pain or leg edema.  His hip pain had improved after physical therapy.  Stated that he has not been having pain for a while now.  He is not on diabetic diet.  He does not exercise.    Review of Systems   Constitutional:  Negative for appetite change, chills, fatigue and fever.   HENT:  Negative for congestion, ear pain, facial swelling, hearing loss, nosebleeds and sore throat.    Eyes:  Negative for pain, discharge and visual disturbance.   Respiratory:  Negative for cough, choking, chest tightness, shortness of breath and wheezing.    Cardiovascular:  Negative for chest pain, palpitations and leg swelling.   Gastrointestinal:  Negative for abdominal distention, abdominal pain, anal bleeding, constipation, diarrhea, nausea and vomiting.   Endocrine: Negative for cold intolerance, heat intolerance, polydipsia, polyphagia and polyuria.   Genitourinary:  Negative for difficulty urinating, frequency, hematuria and urgency.   Musculoskeletal:  Negative for arthralgias, gait problem, joint swelling and myalgias.   Skin:  Negative for color change and rash.   Neurological:  Negative for dizziness, tremors, syncope, weakness, numbness and headaches.   Psychiatric/Behavioral:  Negative for behavioral problems, confusion, sleep disturbance and suicidal ideas. The patient is not nervous/anxious.        Objective   /80 (Patient Position: Sitting)   Temp 36.2 °C (97.2 °F) (Temporal)   Ht 1.727 m (5' 8\")   Wt 108 kg (238 lb)   BMI 36.19 kg/m²     Physical Exam  Constitutional:       General: He is not in acute distress.     Appearance: Normal appearance.   HENT: "      Head: Normocephalic and atraumatic.      Nose: Nose normal.   Eyes:      Extraocular Movements: Extraocular movements intact.      Conjunctiva/sclera: Conjunctivae normal.      Pupils: Pupils are equal, round, and reactive to light.   Cardiovascular:      Rate and Rhythm: Normal rate and regular rhythm.      Pulses: Normal pulses.      Heart sounds: Normal heart sounds.      Comments: Varicose veins of lower extremities b/l  Pulmonary:      Effort: Pulmonary effort is normal.      Breath sounds: Normal breath sounds.   Abdominal:      General: Bowel sounds are normal.      Palpations: Abdomen is soft.   Musculoskeletal:         General: Normal range of motion.      Cervical back: Normal range of motion and neck supple.   Neurological:      General: No focal deficit present.      Mental Status: He is alert and oriented to person, place, and time.   Psychiatric:         Mood and Affect: Mood normal.         Behavior: Behavior normal.         Thought Content: Thought content normal.         Judgment: Judgment normal.         Assessment/Plan   Right hip pain due to osteoarthritis  Improved with physical therapy    Hypertension.  well controlled  Continue doxazosin 4 mg at bedtime  Continue losartan-HCTZ 100-12.5 mg daily  No added salt diet, do not add salt to your food   Try to exercise every day for 30 minutes    Prediabetes  The patient was advised to start low-carb diet   Try to exercise 30 minutes daily    CKD 3b  Avoid nephrotoxic agents such as NSAIDs  Drink at least 32 ounces of water daily  Will keep monitoring    Benign bone lesion of left hip  continue surveillance  X-ray hip requisition is given  Follow up with orthopedic oncology Dr. Hillman, last seen on 7/6/2023     Smoking addiction  Quit smoking in January 2024  CT chest low-dose 11/20/2023, stable pulmonary nodules  AAA ultrasound normal     Pulmonary nodules  stable  CT chest low-dose 11/20/2023, stable pulmonary nodules  Follow up with  pulmonology     Obstructive sleep apnea  APAP recommended, declined     Prostate cancer  Follow up with urology, scheduled on 7/29/2024    CAD  Continue Rosuvastatin 20 mg at bed time  Continue Aspirin 81 mg daily  Try to lose weight  Exercise at least 30 minutes daily  Low-fat diet recommended  Stress test in 2020 normal  Follow-up with cardiology Dr. Acevedo      Hyperlipidemia  Continue Rosuvastatin 20 mg at bed time  low fat, low cholesterol diet  I recommend Mediterranean diet, which include fish, chicken, vegetables and olive oil  Exercise daily for 30 minutes daily or at least 3 times a week     Elevated hematocrit and red blood cells  Most likely due to smoking  Quit smoking January 2024  Improved     Multiple colon polyps  Colonoscopy done in May 2021, repeat colonoscopy in 3 years, new order is placed    Body mass index is 36.19 kg/m².  Try to exercise 30 minutes daily  Low calorie or low carb diet recommended    Health maintenance  Colonoscopy May 2021, repeat colonoscopy in 3 years, new order is placed, not done  Tdap up-to-date  Prevnar 13 done in 2021, Prevnar 20 recommended, not done  Shingrix recommended, not done  See dentist twice a year  Yearly eye exam  see dermatology once a year for a skin check.  Continue multivitamins    Follow-up in 3 months

## 2024-12-23 NOTE — PROGRESS NOTES
"  Department of Medicine  Division of Pulmonary, Critical Care, and Sleep Medicine  Follow-Up Visit  [facility] - [address], [address]    Physician HPI (12/23/2024):  Pleasant 69-year-old man presenting for follow-up on lung nodules.  Denied any respiratory complaints.    History of 50-pack-year, quit 2023.      Immunization History   Administered Date(s) Administered    COVID-19, mRNA, LNP-S, PF, 30 mcg/0.3 mL dose 03/18/2021, 04/08/2021    Tdap vaccine, age 7 year and older (BOOSTRIX, ADACEL) 02/24/2012, 06/21/2014, 06/24/2024       Current Medications:  Current Outpatient Medications   Medication Instructions    aspirin 81 mg EC tablet 1 tablet, Daily    doxazosin (CARDURA) 4 mg, oral, Nightly    losartan-hydrochlorothiazide (Hyzaar) 100-25 mg tablet 1 tablet, oral, Daily    rosuvastatin (CRESTOR) 40 mg, oral, Daily        Drug Allergies/Intolerances:  No Known Allergies       Visit Vitals  /83   Pulse 89   Resp 16   Wt 109 kg (239 lb 4.8 oz)   SpO2 97%   BMI 36.39 kg/m²   Smoking Status Former   BSA 2.29 m²        Physical exam  Constitutional: Normal appearance.  HEENT: Normocephalic and atraumatic.  Cardiovascular: Normal rate and regular rhythm.  Pulmonary: Normal respiratory effort, bilateral clear breath sounds, no wheezing or rhonchi.  Musculoskeletal: No edema, no cyanosis.  Neurological: Awake, alert and oriented x3.  Psychiatric: Normal behavior, mood and affect.      Pulmonary Function Test Results     Pulmonary Functions Testing Results:    No results found for: \"FEV1\", \"FVC\", \"VDN3EAM\", \"TLC\", \"DLCO\"      Chest Radiograph     No results found for this or any previous visit from the past 2000 days.      Echocardiogram     No results found for this or any previous visit from the past 365 days.       Chest CT Scan     CT lung screening low dose 12/13/2024    Narrative  Interpreted By:  Kentrell Null,  STUDY:  CT LUNG SCREENING LOW DOSE; 12/13/2024 8:06 " am    INDICATION:  Signs/Symptoms:smoker.    COMPARISON:  None.    ACCESSION NUMBER(S):  [id number]    ORDERING CLINICIAN:  NETO KIM    TECHNIQUE:  Helical data acquisition of the chest was obtained without IV  contrast material.  Images were reformatted in axial, coronal, and  sagittal planes.    FINDINGS:  LUNGS AND AIRWAYS:      There is mild centrilobular emphysematous changes.There is no focal  consolidation, pleural effusion, or pneumothorax.    There are multifocal centrilobular branching upper lobe opacities  most consistent with smoking-related respiratory bronchiolitis. There  are multiple 2-3 mm subpleural parenchymal lung nodules stable when  compared to a study of 11/20/2023. There are no suspicious  parenchymal lung nodules.    MEDIASTINUM AND LELA, LOWER NECK AND AXILLA:  The visualized thyroid gland is within normal limits.  There are scattered mediastinal lymph nodes are felt to be  reactive/inflammatory in nature. Esophagus appears within normal  limits as seen.    HEART AND VESSELS:  The thoracic aorta normal in course and caliber.  Main pulmonary artery and its branches are normal in caliber.  Estimated coronary artery calcium score is 196.  The cardiac chambers are not enlarged.  There is no pericardial effusion seen.    UPPER ABDOMEN:  There is a stable rounded low-attenuation left adrenal adenoma.        CHEST WALL AND OSSEOUS STRUCTURES:  Chest wall is within normal limits.  No acute osseous pathology.There are no suspicious osseous lesions.    Impression  1. There are extensive branching subcentimeter nodular opacities  consistent with smoking-related respiratory bronchiolitis. There are  no suspicious parenchymal lung nodules. Recommend continued 1 year  low-dose chest CT for surveillance.  2. Estimated coronary artery calcium score is 196* which correlates  with at least 59th percentile rank as compared to matched  "SOLIS-study  subjects(https://www.solis-nhlbi.org/Calcium/input.aspx).    LUNG RADS CATEGORY:  Lung Rad: Lung-RADS 2 (Benign Appearance or Indolent Behavior)    Recommendation: Continue annual screening with Low Dose Chest CT in  12 months, recommended as per American College of Radiology  Guidelines Lung-RADS Version 2022.        *The patient's CAC score was measured with an FDA-cleared AI tool  that correlates well with traditional methods. However, due to the  non-gated CT scan and new algorithm, AI-powered scores should not  replace traditional cardiovascular risk assessment. For further  assistance, refer to the [facility] Cardiovascular Prevention  Program via an [employer] referral to 'Cardiology Prevention Program.'    MACRO:  None    Signed by: Kentrell Null 12/13/2024 6:55 PM  Dictation workstation:   [id number]       Laboratory Studies     Lab Results   Component Value Date    WBC 4.75 12/23/2024    HGB 16.71 12/23/2024    HCT 50.5 12/23/2024    MCV 91.1 12/23/2024    .0 12/23/2024      Lab Results   Component Value Date    GLUCOSE 118 (H) 12/23/2024    CALCIUM 9.5 12/23/2024     12/23/2024    K 5.1 12/23/2024    CO2 30 12/23/2024     12/23/2024    BUN 17 12/23/2024    CREATININE 1.25 (H) 12/23/2024      Lab Results   Component Value Date    ALT 31 12/23/2024    AST 23 12/23/2024        Sputum Culture     No results found for: \"AFBCX\"   No results found for: \"RESPCULTCYFI\"  No results found for the last 90 days.          Assessment and Plan / Recommendations     Pleasant 69-year-old man presenting for follow-up on lung nodules.  Denied any respiratory complaints.  CT from December 13, 2024 with stable multiple bilateral nodules compared to CT from November 2023, suspect smoking-related versus inflammatory.  Continue to monitor  -Low-dose CT screening 1 year (history of 50-pack-year, quit 2023)  -Return to clinic after CT or sooner with any concerns.     This dictation was created using " voice recognition software. Phonetic and/or minor grammatical errors may exist.        Sterling Duncan MD   12/23/2024

## 2025-01-07 ENCOUNTER — ANESTHESIA EVENT (OUTPATIENT)
Dept: GASTROENTEROLOGY | Facility: HOSPITAL | Age: 70
End: 2025-01-07
Payer: MEDICARE

## 2025-01-07 ENCOUNTER — ANESTHESIA (OUTPATIENT)
Dept: GASTROENTEROLOGY | Facility: HOSPITAL | Age: 70
End: 2025-01-07
Payer: MEDICARE

## 2025-01-07 ENCOUNTER — ANESTHESIA EVENT (OUTPATIENT)
Dept: OPERATING ROOM | Facility: HOSPITAL | Age: 70
End: 2025-01-07
Payer: MEDICARE

## 2025-01-07 ENCOUNTER — HOSPITAL ENCOUNTER (OUTPATIENT)
Dept: GASTROENTEROLOGY | Facility: HOSPITAL | Age: 70
Setting detail: OUTPATIENT SURGERY
Discharge: HOME | End: 2025-01-07
Payer: MEDICARE

## 2025-01-07 VITALS
BODY MASS INDEX: 35.27 KG/M2 | RESPIRATION RATE: 16 BRPM | WEIGHT: 238.1 LBS | TEMPERATURE: 96.8 F | HEART RATE: 56 BPM | DIASTOLIC BLOOD PRESSURE: 67 MMHG | HEIGHT: 69 IN | OXYGEN SATURATION: 96 % | SYSTOLIC BLOOD PRESSURE: 110 MMHG

## 2025-01-07 DIAGNOSIS — Z12.11 ENCOUNTER FOR SCREENING FOR MALIGNANT NEOPLASM OF COLON: ICD-10-CM

## 2025-01-07 PROCEDURE — 7100000009 HC PHASE TWO TIME - INITIAL BASE CHARGE

## 2025-01-07 PROCEDURE — 2500000004 HC RX 250 GENERAL PHARMACY W/ HCPCS (ALT 636 FOR OP/ED): Performed by: ANESTHESIOLOGIST ASSISTANT

## 2025-01-07 PROCEDURE — 45385 COLONOSCOPY W/LESION REMOVAL: CPT | Performed by: INTERNAL MEDICINE

## 2025-01-07 PROCEDURE — 3700000001 HC GENERAL ANESTHESIA TIME - INITIAL BASE CHARGE

## 2025-01-07 PROCEDURE — 7100000010 HC PHASE TWO TIME - EACH INCREMENTAL 1 MINUTE

## 2025-01-07 PROCEDURE — A45385 PR COLONOSCOPY,REMV LESN,SNARE: Performed by: ANESTHESIOLOGIST ASSISTANT

## 2025-01-07 PROCEDURE — 3700000002 HC GENERAL ANESTHESIA TIME - EACH INCREMENTAL 1 MINUTE

## 2025-01-07 RX ORDER — FENTANYL CITRATE 50 UG/ML
INJECTION, SOLUTION INTRAMUSCULAR; INTRAVENOUS AS NEEDED
Status: DISCONTINUED | OUTPATIENT
Start: 2025-01-07 | End: 2025-01-07

## 2025-01-07 RX ORDER — PROPOFOL 10 MG/ML
INJECTION, EMULSION INTRAVENOUS CONTINUOUS PRN
Status: DISCONTINUED | OUTPATIENT
Start: 2025-01-07 | End: 2025-01-07

## 2025-01-07 RX ORDER — MIDAZOLAM HYDROCHLORIDE 1 MG/ML
INJECTION INTRAMUSCULAR; INTRAVENOUS AS NEEDED
Status: DISCONTINUED | OUTPATIENT
Start: 2025-01-07 | End: 2025-01-07

## 2025-01-07 RX ADMIN — PROPOFOL 300 MCG/KG/MIN: 10 INJECTION, EMULSION INTRAVENOUS at 10:26

## 2025-01-07 RX ADMIN — FENTANYL CITRATE 25 MCG: 50 INJECTION, SOLUTION INTRAMUSCULAR; INTRAVENOUS at 10:32

## 2025-01-07 RX ADMIN — FENTANYL CITRATE 50 MCG: 50 INJECTION, SOLUTION INTRAMUSCULAR; INTRAVENOUS at 10:26

## 2025-01-07 RX ADMIN — MIDAZOLAM HYDROCHLORIDE 2 MG: 1 INJECTION, SOLUTION INTRAMUSCULAR; INTRAVENOUS at 10:15

## 2025-01-07 RX ADMIN — FENTANYL CITRATE 25 MCG: 50 INJECTION, SOLUTION INTRAMUSCULAR; INTRAVENOUS at 10:40

## 2025-01-07 SDOH — HEALTH STABILITY: MENTAL HEALTH: CURRENT SMOKER: 0

## 2025-01-07 ASSESSMENT — COLUMBIA-SUICIDE SEVERITY RATING SCALE - C-SSRS
2. HAVE YOU ACTUALLY HAD ANY THOUGHTS OF KILLING YOURSELF?: NO
1. IN THE PAST MONTH, HAVE YOU WISHED YOU WERE DEAD OR WISHED YOU COULD GO TO SLEEP AND NOT WAKE UP?: NO
6. HAVE YOU EVER DONE ANYTHING, STARTED TO DO ANYTHING, OR PREPARED TO DO ANYTHING TO END YOUR LIFE?: NO

## 2025-01-07 ASSESSMENT — PAIN SCALES - GENERAL
PAINLEVEL_OUTOF10: 0 - NO PAIN
PAIN_LEVEL: 0

## 2025-01-07 ASSESSMENT — PAIN - FUNCTIONAL ASSESSMENT
PAIN_FUNCTIONAL_ASSESSMENT: UNABLE TO SELF-REPORT
PAIN_FUNCTIONAL_ASSESSMENT: 0-10

## 2025-01-07 NOTE — ANESTHESIA PREPROCEDURE EVALUATION
Patient: Kentrell Munguia    Procedure Information       Anesthesia Start Date/Time: 01/07/25 1011    Scheduled providers: Kwauk Chacon MD; Paige Beaulieu RN; Isma Pollard MD    Procedure: COLONOSCOPY    Location: [facility]            Relevant Problems   Anesthesia (within normal limits)      Cardiac   (+) Benign essential hypertension   (+) Coronary artery disease due to calcified coronary lesion      Pulmonary   (+) Multiple pulmonary nodules   (+) DAVID (obstructive sleep apnea)      Neuro (within normal limits)      GI (within normal limits)      /Renal   (+) Prostate cancer (Multi)      Liver (within normal limits)      Endocrine   (+) Obesity, morbid (Multi)      Hematology (within normal limits)      Musculoskeletal (within normal limits)      HEENT (within normal limits)      ID (within normal limits)      Skin (within normal limits)       Clinical information reviewed:   Tobacco  Allergies  Meds   Med Hx  Surg Hx   Fam Hx  Soc Hx        NPO Detail:  NPO/Void Status  Carbohydrate Drink Given Prior to Surgery? : N  Date of Last Liquid: 01/07/25  Time of Last Liquid: 0500  Date of Last Solid: 01/05/25  Time of Last Solid: 1800  Last Intake Type: Clear fluids; GI prep (gatorade/prep)  Time of Last Void: 0900         Physical Exam    Airway  Mallampati: I  TM distance: >3 FB  Neck ROM: full     Cardiovascular - normal exam     Dental - normal exam     Pulmonary - normal exam     Abdominal - normal exam             Anesthesia Plan    History of general anesthesia?: yes  History of complications of general anesthesia?: no    ASA 3     general     The patient is not a current smoker.    intravenous induction   Anesthetic plan and risks discussed with patient.  Use of blood products discussed with patient who consented to blood products.

## 2025-01-07 NOTE — DISCHARGE INSTRUCTIONS

## 2025-01-07 NOTE — ANESTHESIA POSTPROCEDURE EVALUATION
Patient: Kentrell Munguia    Procedure Summary       Date: 01/07/25 Room / Location: [facility]    Anesthesia Start: 1011 Anesthesia Stop: 1055    Procedure: COLONOSCOPY Diagnosis: Encounter for screening for malignant neoplasm of colon    Scheduled Providers: Kwaku Chacon MD; Paige Beaulieu RN; Isma Pollard MD Responsible Provider: Isma Pollard MD    Anesthesia Type: general ASA Status: 3            Anesthesia Type: general    Vitals Value Taken Time   /67 01/07/25 1130   Temp 36 °C (96.8 °F) 01/07/25 1130   Pulse 56 01/07/25 1130   Resp 16 01/07/25 1130   SpO2 96 % 01/07/25 1130       Anesthesia Post Evaluation    Patient location during evaluation: bedside  Patient participation: complete - patient participated  Level of consciousness: awake and alert  Pain score: 0  Pain management: adequate  Airway patency: patent  Cardiovascular status: acceptable  Respiratory status: acceptable  Hydration status: acceptable  Postoperative Nausea and Vomiting: none        No notable events documented.

## 2025-01-07 NOTE — H&P (VIEW-ONLY)
"History Of Present Illness  Kentrell Munguia is a 69 y.o. male presenting here for open-access colonoscopy for \"average risk screening for colorectal cancer\". However, the patient has had over 10 adenomatous polyps on prior colonoscopies, and therefore is high risk surveillance.     Past Medical History  No past medical history on file.  Surgical History  Past Surgical History:   Procedure Laterality Date    OTHER SURGICAL HISTORY  07/07/2020    Vasectomy     Social History  He reports that he has quit smoking. His smoking use included cigarettes. He started smoking about 55 years ago. He has a 27 pack-year smoking history. He has never used smokeless tobacco. He reports current alcohol use of about 8.0 standard drinks of alcohol per week. He reports current drug use. Drug: Marijuana.    Family History  Family History   Problem Relation Name Age of Onset    Heart attack Mother          Allergies  No Known Allergies       Physical Exam  Constitutional:       Appearance: He is obese.   HENT:      Mouth/Throat:      Mouth: Mucous membranes are moist.   Eyes:      Conjunctiva/sclera: Conjunctivae normal.   Cardiovascular:      Rate and Rhythm: Normal rate.   Pulmonary:      Effort: Pulmonary effort is normal.   Abdominal:      Palpations: Abdomen is soft.   Skin:     General: Skin is warm.   Neurological:      Mental Status: He is oriented to person, place, and time.   Psychiatric:         Mood and Affect: Mood normal.          Last Recorded Vitals  There were no vitals taken for this visit.    Assessment/Plan   open-access colonoscopy for \"average risk screening for colorectal cancer\". However, the patient has had over 10 adenomatous polyps on prior colonoscopies, and therefore is high risk surveillance.     Kwaku Chacon MD  "

## 2025-01-10 ENCOUNTER — HOSPITAL ENCOUNTER (OUTPATIENT)
Facility: HOSPITAL | Age: 70
Setting detail: OUTPATIENT SURGERY
Discharge: HOME | End: 2025-01-10
Attending: SURGERY | Admitting: SURGERY
Payer: MEDICARE

## 2025-01-10 ENCOUNTER — ANESTHESIA (OUTPATIENT)
Dept: OPERATING ROOM | Facility: HOSPITAL | Age: 70
End: 2025-01-10
Payer: MEDICARE

## 2025-01-10 VITALS
WEIGHT: 242.51 LBS | TEMPERATURE: 97.9 F | RESPIRATION RATE: 12 BRPM | HEIGHT: 68 IN | DIASTOLIC BLOOD PRESSURE: 66 MMHG | SYSTOLIC BLOOD PRESSURE: 116 MMHG | OXYGEN SATURATION: 96 % | BODY MASS INDEX: 36.75 KG/M2 | HEART RATE: 74 BPM

## 2025-01-10 DIAGNOSIS — L72.3 SEBACEOUS CYST: ICD-10-CM

## 2025-01-10 LAB
LABORATORY COMMENT REPORT: NORMAL
PATH REPORT.FINAL DX SPEC: NORMAL
PATH REPORT.GROSS SPEC: NORMAL
PATH REPORT.RELEVANT HX SPEC: NORMAL
PATH REPORT.TOTAL CANCER: NORMAL

## 2025-01-10 PROCEDURE — 88304 TISSUE EXAM BY PATHOLOGIST: CPT | Performed by: PATHOLOGY

## 2025-01-10 PROCEDURE — 3600000003 HC OR TIME - INITIAL BASE CHARGE - PROCEDURE LEVEL THREE: Performed by: SURGERY

## 2025-01-10 PROCEDURE — A11401 PR EXC SKIN BENIG 0.6-1 CM TRUNK,ARM,LEG: Performed by: NURSE ANESTHETIST, CERTIFIED REGISTERED

## 2025-01-10 PROCEDURE — A11401 PR EXC SKIN BENIG 0.6-1 CM TRUNK,ARM,LEG: Performed by: ANESTHESIOLOGY

## 2025-01-10 PROCEDURE — 2500000004 HC RX 250 GENERAL PHARMACY W/ HCPCS (ALT 636 FOR OP/ED): Performed by: ANESTHESIOLOGY

## 2025-01-10 PROCEDURE — 7100000002 HC RECOVERY ROOM TIME - EACH INCREMENTAL 1 MINUTE: Performed by: SURGERY

## 2025-01-10 PROCEDURE — 11404 EXC TR-EXT B9+MARG 3.1-4 CM: CPT | Performed by: SURGERY

## 2025-01-10 PROCEDURE — 3700000002 HC GENERAL ANESTHESIA TIME - EACH INCREMENTAL 1 MINUTE: Performed by: SURGERY

## 2025-01-10 PROCEDURE — 2720000007 HC OR 272 NO HCPCS: Performed by: SURGERY

## 2025-01-10 PROCEDURE — 0752T DGTZ GLS MCRSCP SLD LVL III: CPT | Mod: TC,GEALAB | Performed by: SURGERY

## 2025-01-10 PROCEDURE — 2500000004 HC RX 250 GENERAL PHARMACY W/ HCPCS (ALT 636 FOR OP/ED): Performed by: SURGERY

## 2025-01-10 PROCEDURE — 7100000010 HC PHASE TWO TIME - EACH INCREMENTAL 1 MINUTE: Performed by: SURGERY

## 2025-01-10 PROCEDURE — 2500000004 HC RX 250 GENERAL PHARMACY W/ HCPCS (ALT 636 FOR OP/ED): Performed by: NURSE ANESTHETIST, CERTIFIED REGISTERED

## 2025-01-10 PROCEDURE — 3700000001 HC GENERAL ANESTHESIA TIME - INITIAL BASE CHARGE: Performed by: SURGERY

## 2025-01-10 PROCEDURE — 7100000001 HC RECOVERY ROOM TIME - INITIAL BASE CHARGE: Performed by: SURGERY

## 2025-01-10 PROCEDURE — 7100000009 HC PHASE TWO TIME - INITIAL BASE CHARGE: Performed by: SURGERY

## 2025-01-10 PROCEDURE — 3600000008 HC OR TIME - EACH INCREMENTAL 1 MINUTE - PROCEDURE LEVEL THREE: Performed by: SURGERY

## 2025-01-10 PROCEDURE — 2500000005 HC RX 250 GENERAL PHARMACY W/O HCPCS: Performed by: SURGERY

## 2025-01-10 RX ORDER — ALBUTEROL SULFATE 0.83 MG/ML
2.5 SOLUTION RESPIRATORY (INHALATION) ONCE AS NEEDED
Status: ACTIVE | OUTPATIENT
Start: 2025-01-10

## 2025-01-10 RX ORDER — DROPERIDOL 2.5 MG/ML
0.62 INJECTION, SOLUTION INTRAMUSCULAR; INTRAVENOUS ONCE AS NEEDED
Status: ACTIVE | OUTPATIENT
Start: 2025-01-10

## 2025-01-10 RX ORDER — BUPIVACAINE HYDROCHLORIDE 5 MG/ML
INJECTION, SOLUTION PERINEURAL AS NEEDED
Status: DISCONTINUED | OUTPATIENT
Start: 2025-01-10 | End: 2025-01-10 | Stop reason: HOSPADM

## 2025-01-10 RX ORDER — SODIUM CHLORIDE 0.9 G/100ML
IRRIGANT IRRIGATION AS NEEDED
Status: DISCONTINUED | OUTPATIENT
Start: 2025-01-10 | End: 2025-01-10 | Stop reason: HOSPADM

## 2025-01-10 RX ORDER — MIDAZOLAM HYDROCHLORIDE 1 MG/ML
INJECTION INTRAMUSCULAR; INTRAVENOUS AS NEEDED
Status: DISCONTINUED | OUTPATIENT
Start: 2025-01-10 | End: 2025-01-10

## 2025-01-10 RX ORDER — GLYCOPYRROLATE 0.2 MG/ML
INJECTION INTRAMUSCULAR; INTRAVENOUS AS NEEDED
Status: DISCONTINUED | OUTPATIENT
Start: 2025-01-10 | End: 2025-01-10

## 2025-01-10 RX ORDER — MEPERIDINE HYDROCHLORIDE 25 MG/ML
12.5 INJECTION INTRAMUSCULAR; INTRAVENOUS; SUBCUTANEOUS EVERY 10 MIN PRN
Status: ACTIVE | OUTPATIENT
Start: 2025-01-10

## 2025-01-10 RX ORDER — SODIUM CHLORIDE, SODIUM LACTATE, POTASSIUM CHLORIDE, CALCIUM CHLORIDE 600; 310; 30; 20 MG/100ML; MG/100ML; MG/100ML; MG/100ML
100 INJECTION, SOLUTION INTRAVENOUS CONTINUOUS
Status: ACTIVE | OUTPATIENT
Start: 2025-01-10 | End: 2025-01-11

## 2025-01-10 RX ORDER — FENTANYL CITRATE 50 UG/ML
INJECTION, SOLUTION INTRAMUSCULAR; INTRAVENOUS AS NEEDED
Status: DISCONTINUED | OUTPATIENT
Start: 2025-01-10 | End: 2025-01-10

## 2025-01-10 RX ORDER — IBUPROFEN 600 MG/1
600 TABLET ORAL EVERY 6 HOURS PRN
Qty: 20 TABLET | Refills: 0 | Status: SHIPPED | OUTPATIENT
Start: 2025-01-10 | End: 2025-01-20

## 2025-01-10 RX ORDER — PROPOFOL 10 MG/ML
INJECTION, EMULSION INTRAVENOUS AS NEEDED
Status: DISCONTINUED | OUTPATIENT
Start: 2025-01-10 | End: 2025-01-10

## 2025-01-10 RX ORDER — SODIUM CHLORIDE, SODIUM LACTATE, POTASSIUM CHLORIDE, CALCIUM CHLORIDE 600; 310; 30; 20 MG/100ML; MG/100ML; MG/100ML; MG/100ML
20 INJECTION, SOLUTION INTRAVENOUS CONTINUOUS
Status: ACTIVE | OUTPATIENT
Start: 2025-01-10 | End: 2025-01-11

## 2025-01-10 RX ORDER — LIDOCAINE HCL/PF 100 MG/5ML
SYRINGE (ML) INTRAVENOUS AS NEEDED
Status: DISCONTINUED | OUTPATIENT
Start: 2025-01-10 | End: 2025-01-10

## 2025-01-10 RX ORDER — DIPHENHYDRAMINE HYDROCHLORIDE 50 MG/ML
12.5 INJECTION INTRAMUSCULAR; INTRAVENOUS ONCE AS NEEDED
Status: ACTIVE | OUTPATIENT
Start: 2025-01-10

## 2025-01-10 RX ORDER — CEFAZOLIN 1 G/1
INJECTION, POWDER, FOR SOLUTION INTRAVENOUS AS NEEDED
Status: DISCONTINUED | OUTPATIENT
Start: 2025-01-10 | End: 2025-01-10

## 2025-01-10 RX ORDER — ACETAMINOPHEN 325 MG/1
650 TABLET ORAL EVERY 6 HOURS PRN
Qty: 20 TABLET | Refills: 0 | Status: SHIPPED | OUTPATIENT
Start: 2025-01-10 | End: 2025-01-20

## 2025-01-10 RX ORDER — ONDANSETRON HYDROCHLORIDE 2 MG/ML
INJECTION, SOLUTION INTRAVENOUS AS NEEDED
Status: DISCONTINUED | OUTPATIENT
Start: 2025-01-10 | End: 2025-01-10

## 2025-01-10 RX ORDER — ONDANSETRON HYDROCHLORIDE 2 MG/ML
4 INJECTION, SOLUTION INTRAVENOUS ONCE AS NEEDED
Status: ACTIVE | OUTPATIENT
Start: 2025-01-10

## 2025-01-10 RX ORDER — CHLORHEXIDINE GLUCONATE 40 MG/ML
SOLUTION TOPICAL AS NEEDED
Status: DISCONTINUED | OUTPATIENT
Start: 2025-01-10 | End: 2025-01-10 | Stop reason: HOSPADM

## 2025-01-10 RX ORDER — OXYCODONE HYDROCHLORIDE 5 MG/1
5 TABLET ORAL EVERY 4 HOURS PRN
Status: ACTIVE | OUTPATIENT
Start: 2025-01-10

## 2025-01-10 RX ADMIN — DEXAMETHASONE SODIUM PHOSPHATE 8 MG: 4 INJECTION INTRA-ARTICULAR; INTRALESIONAL; INTRAMUSCULAR; INTRAVENOUS; SOFT TISSUE at 13:42

## 2025-01-10 RX ADMIN — LIDOCAINE HYDROCHLORIDE 100 MG: 20 INJECTION INTRAVENOUS at 13:32

## 2025-01-10 RX ADMIN — ONDANSETRON 4 MG: 2 INJECTION, SOLUTION INTRAMUSCULAR; INTRAVENOUS at 13:42

## 2025-01-10 RX ADMIN — PROPOFOL 200 MG: 10 INJECTION, EMULSION INTRAVENOUS at 13:32

## 2025-01-10 RX ADMIN — GLYCOPYRROLATE 0.2 MG: 0.2 INJECTION, SOLUTION INTRAMUSCULAR; INTRAVENOUS at 13:29

## 2025-01-10 RX ADMIN — SODIUM CHLORIDE, POTASSIUM CHLORIDE, SODIUM LACTATE AND CALCIUM CHLORIDE 20 ML/HR: 600; 310; 30; 20 INJECTION, SOLUTION INTRAVENOUS at 12:50

## 2025-01-10 RX ADMIN — FENTANYL CITRATE 50 MCG: 50 INJECTION, SOLUTION INTRAMUSCULAR; INTRAVENOUS at 13:28

## 2025-01-10 RX ADMIN — CEFAZOLIN 2 G: 1 INJECTION, POWDER, FOR SOLUTION INTRAMUSCULAR; INTRAVENOUS at 13:36

## 2025-01-10 RX ADMIN — MIDAZOLAM HYDROCHLORIDE 2 MG: 1 INJECTION, SOLUTION INTRAMUSCULAR; INTRAVENOUS at 13:28

## 2025-01-10 RX ADMIN — FENTANYL CITRATE 50 MCG: 50 INJECTION, SOLUTION INTRAMUSCULAR; INTRAVENOUS at 13:53

## 2025-01-10 SDOH — HEALTH STABILITY: MENTAL HEALTH: CURRENT SMOKER: 0

## 2025-01-10 ASSESSMENT — PAIN SCALES - GENERAL
PAINLEVEL_OUTOF10: 0 - NO PAIN

## 2025-01-10 ASSESSMENT — PAIN - FUNCTIONAL ASSESSMENT
PAIN_FUNCTIONAL_ASSESSMENT: 0-10

## 2025-01-10 NOTE — OP NOTE
EXCISION OF SEBACEOUS CYST X 2 ON BACK Operative Note     Date: 1/10/2025  OR Location: [facility] OR    Name: Kentrell Munguia, : 1955, Age: 69 y.o., MRN: [id number], Sex: male    Diagnosis  Pre-op Diagnosis      * Sebaceous cyst [L72.3] Post-op Diagnosis     * Sebaceous cyst [L72.3]     Procedures  EXCISION OF SEBACEOUS CYST X 2 ON BACK  06507 - MS EXC B9 LESION MRGN XCP SK TG T/A/L 0.6-1.0 CM      Surgeons      * Aggie Bonilla - Primary    Resident/Fellow/Other Assistant:  Surgeons and Role:  * No surgeons found with a matching role *    Staff:   Scrub Person: Jordon  Scrub Person: Solis  Circulator: Paige    Anesthesia Staff: Anesthesiologist: Junior Jackson MD  CRNA: DEVANG Lagos-CRNA    Procedure Summary  Anesthesia: General  ASA: III  Estimated Blood Loss: 1mL  Intra-op Medications:   Administrations occurring from 1300 to 1415 on 01/10/25:   Medication Name Total Dose   BUPivacaine HCl (Marcaine) 0.5 % (5 mg/mL) injection 10 mL   sodium chloride 0.9 % irrigation solution 1,000 mL   chlorhexidine (Hibiclens) 4 % liquid 1 Application   ceFAZolin (Ancef) vial 1 g 2 g   dexAMETHasone (Decadron) injection 4 mg/mL 8 mg   fentaNYL (Sublimaze) injection 50 mcg/mL 100 mcg   glycopyrrolate (Robinul) injection 0.2 mg   lidocaine (cardiac) injection 2% prefilled syringe 100 mg   midazolam PF (Versed) injection 1 mg/mL 2 mg   ondansetron (Zofran) 2 mg/mL injection 4 mg   propofol (Diprivan) injection 10 mg/mL 200 mg              Anesthesia Record               Intraprocedure I/O Totals       None           Specimen:   ID Type Source Tests Collected by Time   1 : LOWER LEFT BACK CYST Tissue SKIN CYST SURGICAL PATHOLOGY EXAM Aggie Bonilla MD 1/10/2025 1352   2 : UPPER LEFT BACK CYST Tissue SKIN CYST SURGICAL PATHOLOGY EXAM Aggie Bonilla MD 1/10/2025 1400                 Drains and/or Catheters: * None in log *    Tourniquet Times:         Implants:     Findings: Multiple sebaceous cyst    Indications: Kentrell  Ezra is an 69 y.o. male who is having surgery for Sebaceous cyst [L72.3].     The patient was seen in the preoperative area. The risks, benefits, complications, treatment options, non-operative alternatives, expected recovery and outcomes were discussed with the patient. The possibilities of reaction to medication, pulmonary aspiration, injury to surrounding structures, bleeding, recurrent infection, the need for additional procedures, failure to diagnose a condition, and creating a complication requiring transfusion or operation were discussed with the patient. The patient concurred with the proposed plan, giving informed consent.  The site of surgery was properly noted/marked if necessary per policy. The patient has been actively warmed in preoperative area. Preoperative antibiotics have been ordered and given within 1 hours of incision. Venous thrombosis prophylaxis have been ordered including bilateral sequential compression devices    Procedure Details: The patient was brought to the operating theater and placed on the operating table in the supine position after sufficient general anesthesia was administered he was placed right-side-down left side up and the area of the back was prepped and draped in the usual sterile manner.  He had a large sebaceous cyst that had previously been infected and this still had some openings to the skin this was on the t left side of the back.  This was excised sharply and also using cautery it was enough of an ellipse of skin that we had to raise some flaps on both sides in order to close the skin and was closed using 2-0 Vicryl sutures for a deeper layer followed by a running 4-0 Vicryl subcuticular suture.  Marcaine was used for some local anesthetic and Dermabond for the skin he had 3 other small sebaceous cysts in that various locations of the back that were excised using cautery he has basically were quite large blackheads and they were removed I believe in their  entirety one of the larger ones also on the right side of the back but upper was closed using a 4-0 Vicryl suture.  The other 2 spots were treated with some antibiotic ointment and covered with a Band-Aid.  Complications:  None; patient tolerated the procedure well.    Disposition: PACU - hemodynamically stable.  Condition: stable                 Additional Details:     Attending Attestation:     Aggie Bonilla  Phone Number: [phone]

## 2025-01-10 NOTE — ANESTHESIA PROCEDURE NOTES
Airway  Date/Time: 1/10/2025 1:33 PM  Urgency: elective    Airway not difficult    Staffing  Performed: CRNA   Authorized by: Junior Jackson MD    Performed by: VANNESA Lagos  Patient location during procedure: OR    Indications and Patient Condition  Indications for airway management: anesthesia  Spontaneous Ventilation: absent  Sedation level: deep  Preoxygenated: yes  Patient position: sniffing  Mask difficulty assessment: 1 - vent by mask    Final Airway Details  Final airway type: supraglottic airway      Successful airway: Size 4     Number of attempts at approach: 1  Number of other approaches attempted: 0

## 2025-01-10 NOTE — ANESTHESIA PREPROCEDURE EVALUATION
Patient: Kentrell Munguia    Procedure Information       Anesthesia Start Date/Time: 01/10/25 1324    Procedure: EXCISION OF SEBACEOUS CYST X 2 ON BACK (Back)    Location: [facility] OR 05 / [facility]    Surgeons: Aggie Bonilla MD            Relevant Problems   Cardiac   (+) Benign essential hypertension   (+) Coronary artery disease due to calcified coronary lesion      Pulmonary   (+) Multiple pulmonary nodules   (+) DAVID (obstructive sleep apnea)      /Renal   (+) Prostate cancer (Multi)      Endocrine   (+) Obesity, morbid (Multi)       Clinical information reviewed:   Tobacco  Allergies  Meds   Med Hx  Surg Hx   Fam Hx  Soc Hx        NPO Detail:  NPO/Void Status  Carbohydrate Drink Given Prior to Surgery? : N  Date of Last Liquid: 01/09/25  Time of Last Liquid: 2000  Date of Last Solid: 01/09/25  Time of Last Solid: 2000  Last Intake Type: Clear fluids  Time of Last Void: 1100         Physical Exam    Airway  Mallampati: II  TM distance: >3 FB     Cardiovascular - normal exam     Dental        Pulmonary - normal exam     Abdominal - normal exam           Anesthesia Plan    History of general anesthesia?: yes  History of complications of general anesthesia?: no    ASA 3     general     The patient is not a current smoker.    intravenous induction   Anesthetic plan and risks discussed with patient.    Plan discussed with CRNA and attending.

## 2025-01-13 ASSESSMENT — PAIN SCALES - GENERAL: PAIN_LEVEL: 2

## 2025-01-13 NOTE — ANESTHESIA POSTPROCEDURE EVALUATION
Patient: Kentrell Munguia    Procedure Summary       Date: 01/10/25 Room / Location: [facility] OR [facility] / [facility]    Anesthesia Start: 1324 Anesthesia Stop: 1423    Procedure: EXCISION OF SEBACEOUS CYST X 2 ON BACK (Back) Diagnosis:       Sebaceous cyst      (Sebaceous cyst [L72.3])    Surgeons: Aggie Bonilla MD Responsible Provider: Junior Jackson MD    Anesthesia Type: general ASA Status: 3            Anesthesia Type: general    Vitals Value Taken Time   /73 01/10/25 1445   Temp 36.6 °C (97.9 °F) 01/10/25 1423   Pulse 79 01/10/25 1445   Resp 12 01/10/25 1438   SpO2 97 % 01/10/25 1445       Anesthesia Post Evaluation    Patient location during evaluation: PACU  Patient participation: complete - patient participated  Level of consciousness: awake  Pain score: 2  Pain management: adequate  Multimodal analgesia pain management approach  Airway patency: patent  Two or more strategies used to mitigate risk of obstructive sleep apnea  Cardiovascular status: acceptable  Respiratory status: acceptable  Hydration status: acceptable  Postoperative Nausea and Vomiting: none    No notable events documented.

## 2025-01-17 LAB
LABORATORY COMMENT REPORT: NORMAL
PATH REPORT.FINAL DX SPEC: NORMAL
PATH REPORT.GROSS SPEC: NORMAL
PATH REPORT.MICROSCOPIC SPEC OTHER STN: NORMAL
PATH REPORT.RELEVANT HX SPEC: NORMAL
PATH REPORT.TOTAL CANCER: NORMAL

## 2025-01-20 ENCOUNTER — APPOINTMENT (OUTPATIENT)
Dept: SURGERY | Facility: CLINIC | Age: 70
End: 2025-01-20
Payer: MEDICARE

## 2025-01-20 VITALS
RESPIRATION RATE: 18 BRPM | OXYGEN SATURATION: 72 % | DIASTOLIC BLOOD PRESSURE: 72 MMHG | HEIGHT: 68 IN | TEMPERATURE: 97.2 F | BODY MASS INDEX: 37.07 KG/M2 | HEART RATE: 96 BPM | WEIGHT: 244.6 LBS | SYSTOLIC BLOOD PRESSURE: 127 MMHG

## 2025-01-20 DIAGNOSIS — L72.3 SEBACEOUS CYST: Primary | ICD-10-CM

## 2025-01-20 PROCEDURE — 1159F MED LIST DOCD IN RCRD: CPT | Performed by: SURGERY

## 2025-01-20 PROCEDURE — 3078F DIAST BP <80 MM HG: CPT | Performed by: SURGERY

## 2025-01-20 PROCEDURE — 3074F SYST BP LT 130 MM HG: CPT | Performed by: SURGERY

## 2025-01-20 PROCEDURE — 99024 POSTOP FOLLOW-UP VISIT: CPT | Performed by: SURGERY

## 2025-01-20 PROCEDURE — 3008F BODY MASS INDEX DOCD: CPT | Performed by: SURGERY

## 2025-01-20 NOTE — PROGRESS NOTES
Subjective   Patient ID: Kentrell Munguia is a 69 y.o. male who presents for Post-op (POV Excision of Back Abscesses).  HPI this is a pleasant patient here today for follow-up after we excised a very large sebaceous cyst from the back and then 2 smaller areas.  He is doing well.  No complaints.    Review of Systems no fever    Objective   Physical Exam the large 1 that was removed that incision is well-healed there are no signs of infection the other 2 smaller areas are some open areas of eschar but also dry and no signs of infection.  The pathology was all benign    Assessment/Plan can follow-up with me as needed           Aggie Bonilla MD 01/20/25 3:15 PM

## 2025-02-14 ENCOUNTER — APPOINTMENT (OUTPATIENT)
Dept: UROLOGY | Facility: CLINIC | Age: 70
End: 2025-02-14
Payer: MEDICARE

## 2025-05-13 DIAGNOSIS — I10 BENIGN ESSENTIAL HYPERTENSION: ICD-10-CM

## 2025-05-13 RX ORDER — DOXAZOSIN 4 MG/1
4 TABLET ORAL NIGHTLY
Qty: 90 TABLET | Refills: 0 | Status: SHIPPED | OUTPATIENT
Start: 2025-05-13

## 2025-05-23 ENCOUNTER — APPOINTMENT (OUTPATIENT)
Dept: UROLOGY | Facility: CLINIC | Age: 70
End: 2025-05-23
Payer: MEDICARE

## 2025-06-02 ENCOUNTER — APPOINTMENT (OUTPATIENT)
Dept: UROLOGY | Facility: CLINIC | Age: 70
End: 2025-06-02
Payer: MEDICARE

## 2025-06-18 DIAGNOSIS — C61 PROSTATE CANCER (MULTI): ICD-10-CM

## 2025-06-23 ENCOUNTER — APPOINTMENT (OUTPATIENT)
Dept: PRIMARY CARE | Facility: CLINIC | Age: 70
End: 2025-06-23
Payer: MEDICARE

## 2025-06-25 ENCOUNTER — APPOINTMENT (OUTPATIENT)
Dept: PRIMARY CARE | Facility: CLINIC | Age: 70
End: 2025-06-25
Payer: MEDICARE

## 2025-06-27 ENCOUNTER — APPOINTMENT (OUTPATIENT)
Dept: PRIMARY CARE | Facility: CLINIC | Age: 70
End: 2025-06-27
Payer: MEDICARE

## 2025-06-27 VITALS
WEIGHT: 240 LBS | BODY MASS INDEX: 36.37 KG/M2 | SYSTOLIC BLOOD PRESSURE: 112 MMHG | HEIGHT: 68 IN | DIASTOLIC BLOOD PRESSURE: 70 MMHG | TEMPERATURE: 97.3 F

## 2025-06-27 DIAGNOSIS — R91.8 MULTIPLE PULMONARY NODULES: ICD-10-CM

## 2025-06-27 DIAGNOSIS — R73.03 PREDIABETES: ICD-10-CM

## 2025-06-27 DIAGNOSIS — G47.33 OSA (OBSTRUCTIVE SLEEP APNEA): ICD-10-CM

## 2025-06-27 DIAGNOSIS — C61 PROSTATE CANCER (MULTI): ICD-10-CM

## 2025-06-27 DIAGNOSIS — Z12.11 ENCOUNTER FOR SCREENING FOR MALIGNANT NEOPLASM OF COLON: ICD-10-CM

## 2025-06-27 DIAGNOSIS — J84.9 INTERSTITIAL LUNG DISEASE (MULTI): ICD-10-CM

## 2025-06-27 DIAGNOSIS — E78.5 DYSLIPIDEMIA: ICD-10-CM

## 2025-06-27 DIAGNOSIS — I25.84 CORONARY ARTERY DISEASE DUE TO CALCIFIED CORONARY LESION: ICD-10-CM

## 2025-06-27 DIAGNOSIS — R35.1 NOCTURIA: ICD-10-CM

## 2025-06-27 DIAGNOSIS — I25.10 CORONARY ARTERY DISEASE DUE TO CALCIFIED CORONARY LESION: ICD-10-CM

## 2025-06-27 DIAGNOSIS — F17.219 CIGARETTE NICOTINE DEPENDENCE WITH NICOTINE-INDUCED DISORDER: ICD-10-CM

## 2025-06-27 DIAGNOSIS — E66.01 OBESITY, MORBID (MULTI): ICD-10-CM

## 2025-06-27 DIAGNOSIS — N18.31 STAGE 3A CHRONIC KIDNEY DISEASE (MULTI): ICD-10-CM

## 2025-06-27 DIAGNOSIS — E55.9 MILD VITAMIN D DEFICIENCY: ICD-10-CM

## 2025-06-27 DIAGNOSIS — Z00.00 ROUTINE GENERAL MEDICAL EXAMINATION AT HEALTH CARE FACILITY: Primary | ICD-10-CM

## 2025-06-27 DIAGNOSIS — J42 CHRONIC BRONCHITIS, UNSPECIFIED CHRONIC BRONCHITIS TYPE (MULTI): ICD-10-CM

## 2025-06-27 DIAGNOSIS — I10 BENIGN ESSENTIAL HYPERTENSION: ICD-10-CM

## 2025-06-27 DIAGNOSIS — R97.20 ELEVATED PSA: ICD-10-CM

## 2025-06-27 PROBLEM — F17.210 DEPENDENCE ON NICOTINE FROM CIGARETTES: Status: RESOLVED | Noted: 2023-06-16 | Resolved: 2025-06-27

## 2025-06-27 LAB
25(OH)D3 SERPL-MCNC: 28 NG/ML (ref 30–100)
ALT SERPL W P-5'-P-CCNC: 38 U/L (ref 14–59)
ANION GAP SERPL CALC-SCNC: 10 MMOL/L (ref 10–20)
AST SERPL W P-5'-P-CCNC: 25 U/L (ref 15–37)
BASOPHILS # BLD AUTO: 0.02 X10*3/UL (ref 0.1–1.6)
BASOPHILS NFR BLD AUTO: 0.36 % (ref 0–0.3)
BUN SERPL-MCNC: 19 MG/DL (ref 7–18)
CALCIUM SERPL-MCNC: 9.4 MG/DL (ref 8.5–10.1)
CHLORIDE SERPL-SCNC: 102 MMOL/L (ref 98–107)
CHOLEST SERPL-MCNC: 145 MG/DL (ref 0–199)
CHOLESTEROL/HDL RATIO: 2.2 (ref 4.2–7)
CO2 SERPL-SCNC: 30 MMOL/L (ref 21–32)
CREAT SERPL-MCNC: 1.07 MG/DL (ref 0.6–1.1)
CREAT UR STRIP-MCNC: 300 MG/DL
EGFRCR SERPLBLD CKD-EPI 2021: 76 ML/MIN/1.73M*2
EOSINOPHIL # BLD AUTO: 0.14 X10*3/UL (ref 0.04–0.5)
EOSINOPHIL NFR BLD AUTO: 2.62 % (ref 0.7–7)
ERYTHROCYTE [DISTWIDTH] IN BLOOD BY AUTOMATED COUNT: 15.5 % (ref 11.5–14.5)
GLUCOSE SERPL-MCNC: 112 MG/DL (ref 74–100)
HBA1C MFR BLD: 6.1 %
HCT VFR BLD AUTO: 47.6 % (ref 38.4–51.3)
HDLC SERPL-MCNC: 67 MG/DL (ref 40–59)
HGB BLD-MCNC: 16.5 G/DL (ref 12.7–17)
IS PATIENT FASTING: YES
LDLC SERPL DIRECT ASSAY-MCNC: 69 MG/DL (ref 0–100)
LYMPHOCYTES # BLD AUTO: 1.23 X10*3/UL (ref 0–6)
LYMPHOCYTES NFR BLD AUTO: 22.57 % (ref 20.5–51.1)
MCH RBC QN AUTO: 31 PG (ref 26–32)
MCHC RBC AUTO-ENTMCNC: 34.7 G/DL (ref 31–38)
MCV RBC AUTO: 89.3 FL (ref 80–96)
MICROALBUMIN UR TEST STR-MCNC: 30 MG/L
MONOCYTES # BLD AUTO: 0.55 X10*3/UL (ref 1.6–24.9)
MONOCYTES NFR BLD AUTO: 10.16 % (ref 1.7–9.3)
NEUTROPHILS # BLD AUTO: 3.5 X10*3/UL (ref 1.4–6.5)
NEUTROPHILS NFR BLD AUTO: 64.29 % (ref 42.2–75.2)
PLATELET # BLD AUTO: 208.2 X10*3/UL (ref 150–450)
PMV BLD AUTO: 9.18 FL (ref 7.8–11)
POTASSIUM SERPL-SCNC: 4.3 MMOL/L (ref 3.5–5.1)
PROT/CREAT UR: <30 UG/MG CREAT
PSA SERPL-MCNC: 9.94 NG/ML
RBC # BLD AUTO: 5.33 X10*6/UL (ref 4.1–5.6)
SODIUM SERPL-SCNC: 138 MMOL/L (ref 136–145)
TRIGL SERPL-MCNC: 70 MG/DL
TSH SERPL-ACNC: 1.19 MIU/L (ref 0.44–3.98)
WBC # BLD AUTO: 5.44 X10*3/UL (ref 4.5–10.5)

## 2025-06-27 ASSESSMENT — ENCOUNTER SYMPTOMS
NAUSEA: 0
CHEST TIGHTNESS: 0
VOMITING: 0
SORE THROAT: 0
WHEEZING: 0
DEPRESSION: 0
NUMBNESS: 0
ABDOMINAL PAIN: 0
APPETITE CHANGE: 0
SLEEP DISTURBANCE: 0
LOSS OF SENSATION IN FEET: 0
TREMORS: 0
ABDOMINAL DISTENTION: 0
DIZZINESS: 0
DIARRHEA: 0
PALPITATIONS: 0
POLYDIPSIA: 0
CONFUSION: 0
FEVER: 0
EYE PAIN: 0
FACIAL SWELLING: 0
COLOR CHANGE: 0
CHILLS: 0
JOINT SWELLING: 0
WEAKNESS: 0
HEMATURIA: 0
CONSTIPATION: 0
POLYPHAGIA: 0
ANAL BLEEDING: 0
MYALGIAS: 0
FREQUENCY: 0
EYE DISCHARGE: 0
FATIGUE: 0
ARTHRALGIAS: 0
OCCASIONAL FEELINGS OF UNSTEADINESS: 0
NERVOUS/ANXIOUS: 0
DIFFICULTY URINATING: 0
HEADACHES: 0
COUGH: 0
SHORTNESS OF BREATH: 0
CHOKING: 0

## 2025-06-27 ASSESSMENT — ACTIVITIES OF DAILY LIVING (ADL)
HEARING - RIGHT EAR: FUNCTIONAL
ADEQUATE_TO_COMPLETE_ADL: YES
MANAGING FINANCES: INDEPENDENT
PATIENT'S MEMORY ADEQUATE TO SAFELY COMPLETE DAILY ACTIVITIES?: YES
TAKING MEDICATION: INDEPENDENT
HEARING - LEFT EAR: FUNCTIONAL
GROOMING: INDEPENDENT
PREPARING MEALS: INDEPENDENT
PILL BOX USED: YES
USING TRANSPORTATION: INDEPENDENT
FEEDING YOURSELF: INDEPENDENT
GROCERY SHOPPING: INDEPENDENT
BATHING: INDEPENDENT
ASSISTIVE_DEVICE: EYEGLASSES
NEEDS ASSISTANCE WITH FOOD: INDEPENDENT
DRESSING YOURSELF: INDEPENDENT
TOILETING: INDEPENDENT
EATING: INDEPENDENT
STIL DRIVING: YES
USING TELEPHONE: INDEPENDENT
WALKS IN HOME: INDEPENDENT
JUDGMENT_ADEQUATE_SAFELY_COMPLETE_DAILY_ACTIVITIES: YES
DOING HOUSEWORK: INDEPENDENT

## 2025-06-27 ASSESSMENT — COLUMBIA-SUICIDE SEVERITY RATING SCALE - C-SSRS
6. HAVE YOU EVER DONE ANYTHING, STARTED TO DO ANYTHING, OR PREPARED TO DO ANYTHING TO END YOUR LIFE?: NO
2. HAVE YOU ACTUALLY HAD ANY THOUGHTS OF KILLING YOURSELF?: NO
1. IN THE PAST MONTH, HAVE YOU WISHED YOU WERE DEAD OR WISHED YOU COULD GO TO SLEEP AND NOT WAKE UP?: NO

## 2025-06-27 ASSESSMENT — GERIATRIC MINI NUTRITIONAL ASSESSMENT (MNA)
D HAS SUFFERED PSYCHOLOGICAL STRESS OR ACUTE DISEASE IN THE PAST 3 MONTHS?: NO
E NEUROPSYCHOLOGICAL PROBLEMS: NO PSYCHOLOGICAL PROBLEMS
A HAS FOOD INTAKE DECLINED OVER THE PAST 3 MONTHS DUE TO LOSS OF APPETITE, DIGESTIVE PROBLEMS, CHEWING OR SWALLOWING DIFFICULTIES?: NO DECREASE IN FOOD INTAKE
C GENERAL MOBILITY: GOES OUT
B WEIGHT LOSS DURING THE LAST 3 MONTHS: NO WEIGHT LOSS

## 2025-06-27 ASSESSMENT — PAIN SCALES - GENERAL: PAINLEVEL_OUTOF10: 0-NO PAIN

## 2025-06-27 NOTE — PROGRESS NOTES
"Subjective   Patient ID: Kentrell Munguia is a 69 y.o. male with a history of CAD, hypertension, DAVID, pulmonary nodules, former cigarette smoker, CKD, hyperlipidemia, prediabetes, prostate cancer and colon polyps who presents for Medicare Annual Wellness Visit Initial    HPI the patient is presented for follow-up.  He takes all his medications as prescribed.  He does not monitor his blood pressure at home.  He does not exercise but he is physically active.  He denies shortness of breath, chest pain, headaches, dizziness, constipation, or abdominal pain    Review of Systems   Constitutional:  Negative for appetite change, chills, fatigue and fever.   HENT:  Negative for congestion, ear pain, facial swelling, hearing loss, nosebleeds and sore throat.    Eyes:  Negative for pain, discharge and visual disturbance.   Respiratory:  Negative for cough, choking, chest tightness, shortness of breath and wheezing.    Cardiovascular:  Negative for chest pain, palpitations and leg swelling.   Gastrointestinal:  Negative for abdominal distention, abdominal pain, anal bleeding, constipation, diarrhea, nausea and vomiting.   Endocrine: Negative for cold intolerance, heat intolerance, polydipsia, polyphagia and polyuria.   Genitourinary:  Negative for difficulty urinating, frequency, hematuria and urgency.   Musculoskeletal:  Negative for arthralgias, gait problem, joint swelling and myalgias.   Skin:  Negative for color change and rash.   Neurological:  Negative for dizziness, tremors, syncope, weakness, numbness and headaches.   Psychiatric/Behavioral:  Negative for behavioral problems, confusion, sleep disturbance and suicidal ideas. The patient is not nervous/anxious.        Objective   /70 (Patient Position: Sitting)   Temp 36.3 °C (97.3 °F) (Temporal)   Ht 1.727 m (5' 8\")   Wt 109 kg (240 lb)   BMI 36.49 kg/m²     Physical Exam  Constitutional:       General: He is not in acute distress.     Appearance: Normal " appearance.   HENT:      Head: Normocephalic and atraumatic.      Nose: Nose normal.   Eyes:      Extraocular Movements: Extraocular movements intact.      Conjunctiva/sclera: Conjunctivae normal.      Pupils: Pupils are equal, round, and reactive to light.   Cardiovascular:      Rate and Rhythm: Normal rate and regular rhythm.      Pulses: Normal pulses.      Heart sounds: Normal heart sounds.      Comments: Varicose veins of lower extremities b/l  Pulmonary:      Effort: Pulmonary effort is normal.      Breath sounds: Normal breath sounds.   Abdominal:      General: Bowel sounds are normal.      Palpations: Abdomen is soft.   Musculoskeletal:         General: Normal range of motion.      Cervical back: Normal range of motion and neck supple.   Neurological:      General: No focal deficit present.      Mental Status: He is alert and oriented to person, place, and time.   Psychiatric:         Mood and Affect: Mood normal.         Behavior: Behavior normal.         Thought Content: Thought content normal.         Judgment: Judgment normal.         Assessment/Plan   Hypertension.  well controlled  Continue doxazosin 4 mg at bedtime  Continue losartan-HCTZ 100-12.5 mg daily  No added salt diet, do not add salt to your food   Try to exercise every day for 30 minutes    Prediabetes  The patient was advised to start low-carb diet   Try to exercise 30 minutes daily    Right hip pain due to osteoarthritis  Improved with physical therapy    CKD 3b  Avoid nephrotoxic agents such as NSAIDs  Drink at least 32 ounces of water daily  Will keep monitoring    Benign bone lesion of left hip  continue surveillance  X-ray hip requisition is given  Follow up with orthopedic oncology Dr. Hillman, last seen on 7/6/2023     Smoking addiction  Quit smoking in January 2024  CT chest low-dose 11/20/2023, stable pulmonary nodules  AAA ultrasound normal     Pulmonary nodules  stable  CT chest low-dose 11/20/2023, stable pulmonary  nodules  Follow up with pulmonology     Obstructive sleep apnea  APAP recommended, declined     Prostate cancer  Follow up with urology, scheduled on 7/29/2024    CAD  Continue Rosuvastatin 20 mg at bed time  Continue Aspirin 81 mg daily  Try to lose weight  Exercise at least 30 minutes daily  Low-fat diet recommended  Stress test in 2020 normal  Follow-up with cardiology Dr. Acevedo      Hyperlipidemia  Continue Rosuvastatin 20 mg at bed time  low fat, low cholesterol diet  I recommend Mediterranean diet, which include fish, chicken, vegetables and olive oil  Exercise daily for 30 minutes daily or at least 3 times a week     Multiple colon polyps  Colonoscopy 1/7/25 repeat in 1/7/2028     Body mass index is 36.49 kg/m².  Try to exercise 30 minutes daily  Low calorie or low carb diet recommended    Health maintenance  Colonoscopy 1/7/25 repeat in 1/7/2028   Tdap up-to-date  Prevnar 13 done in 2021, Prevnar 20 recommended, not done  Shingrix recommended, not done  See dentist twice a year  Yearly eye exam  see dermatology once a year for a skin check.  Continue multivitamins    Follow-up in 3 months

## 2025-06-27 NOTE — ASSESSMENT & PLAN NOTE
Orders:    Basic Metabolic Panel    CBC w/5 Part Differential, [facility]    Thyroid Stimulating Hormone

## 2025-06-27 NOTE — PROGRESS NOTES
"Subjective   Reason for Visit: Kentrell Munguia is an 69 y.o. male here for a Medicare Wellness visit.     Past Medical, Surgical, and Family History reviewed and updated in chart.    Reviewed all medications by prescribing practitioner or clinical pharmacist (such as prescriptions, OTCs, herbal therapies and supplements) and documented in the medical record.    HPI    Patient Care Team:  Odalis Avila PA-C as PCP - General (Internal Medicine)  Odalis Avila PA-C as PCP - [facility] Attributed Provider     Review of Systems    Objective   Vitals:  /70 (Patient Position: Sitting)   Temp 36.3 °C (97.3 °F) (Temporal)   Ht 1.727 m (5' 8\")   Wt 109 kg (240 lb)   BMI 36.49 kg/m²       Physical Exam    Assessment & Plan  Interstitial lung disease (Multi)         Chronic bronchitis, unspecified chronic bronchitis type (Multi)         Prostate cancer (Multi)         Obesity, morbid (Multi)         Stage 3a chronic kidney disease (Multi)    Orders:    POCT ALBUMIN RANDOM URINE DOCKED DEVICE    Routine general medical examination at health care facility    Orders:    1 Year Follow Up In Primary Care - Wellness Exam; Future    Prediabetes    Orders:    Hemoglobin A1C    Mild vitamin D deficiency    Orders:    Vitamin D 25-Hydroxy,Total (for eval of Vitamin D levels)    Dyslipidemia    Orders:    Alanine Aminotransferase    Aspartate Aminotransferase    Lipid Panel    Benign essential hypertension    Orders:    Basic Metabolic Panel    CBC w/5 Part Differential, [facility]    Thyroid Stimulating Hormone    Nocturia    Orders:    Prostate Specific Antigen    Encounter for screening for malignant neoplasm of colon         Elevated PSA    Orders:    Prostate Specific Antigen    BMI 33.0-33.9,adult         Coronary artery disease due to calcified coronary lesion         Cigarette nicotine dependence with nicotine-induced disorder         Multiple pulmonary nodules         DAVID (obstructive sleep apnea)          "

## 2025-07-01 DIAGNOSIS — I10 BENIGN ESSENTIAL HYPERTENSION: ICD-10-CM

## 2025-07-01 DIAGNOSIS — E78.5 HYPERLIPIDEMIA, UNSPECIFIED: ICD-10-CM

## 2025-07-01 RX ORDER — LOSARTAN POTASSIUM AND HYDROCHLOROTHIAZIDE 25; 100 MG/1; MG/1
1 TABLET ORAL DAILY
Qty: 90 TABLET | Refills: 2 | Status: SHIPPED | OUTPATIENT
Start: 2025-07-01 | End: 2026-07-01

## 2025-07-01 RX ORDER — ROSUVASTATIN CALCIUM 40 MG/1
40 TABLET, COATED ORAL DAILY
Qty: 90 TABLET | Refills: 2 | Status: SHIPPED | OUTPATIENT
Start: 2025-07-01

## 2025-07-03 NOTE — PROGRESS NOTES
Subjective   Patient ID: Kentrell Munguia is a 69 y.o. male.    Virtual or Telephone Consent    An interactive audio and video telecommunication system which permits real time communications between the patient (at the originating site) and provider (at the distant site) was utilized to provide this telehealth service.   Verbal consent was requested and obtained from Kentrell Munguia on this date, 07/07/25 for a telehealth visit and the patient's location was confirmed at the time of the visit.      HPI  69 y.o. male presents for a virtual follow up visit regarding active surveillance for a history of GG1 prostate cancer.  He is status post biopsy on 09/15/2020. Final pathology detailed prostatic adenocarcinoma, GG1 (Grass Range score 3+3=6), 1/2 cores (5% of tissue). He underwent an MRI of the prostate on 08/16/2024 that detailed a 63.8 gram prostate, BPH changes of the TZ, diffuse non nodular hypointensities within the PZ, without evidence of focally restricted diffusion (PI-RADS 2). PSA level is 9.94.    He is experiencing 1-2 episodes of nocturia per night. This is not affecting his quality of life. He has no other major urinary symptoms.     He has a history of CKD stage 3.    PSA: 14.18 (6/24/24)     Lab Results   Component Value Date    PSA 9.94 (H) 06/27/2025    PSA 8.79 (H) 12/23/2024    PSA 6.8 (H) 11/15/2021       MRI Prostate (8/16/24): 63.8g, BPH changes of the TZ, diffuse non nodular hypointensities within the PZ, without evidence of focally restricted diffusion (PI-RADS 2), T1 hyperintense lesion in the left femoral neck, unchanged from prior, nonspecific on the current exam.     FINAL DIAGNOSIS   A.  PROSTATE, LEFT APEX, NEEDLE BIOPSIES:  --ADENOCARCINOMA, SHALONDA PATTERN 3 + 3 = 6, GRADE GROUP 1, INVOLVING 2 OF 3 CORES, AND APPROXIMATELY 8% OF THE SPECIMEN. SEE NOTE.      Note: Immunostain for PIN-4 confirms the absence of basal cells in the malignant acini.     B., C.  PROSTATE, LEFT MID, LEFT BASE,  NEEDLE BIOPSIES:  --PROSTATE TISSUE WITH NO EVIDENCE OF MALIGNANCY.     D.  PROSTATE, RIGHT APEX, NEEDLE BIOPSIES:  --FOCUS OF ATYPICAL SMALL ACINAR PROLIFERATION, SUSPICIOUS FOR BUT NOT DIAGNOSTIC OF MALIGNANCY.     E., F.  PROSTATE, RIGHT MID, RIGHT BASE, NEEDLE BIOPSIES:  --PROSTATE TISSUE WITH NO EVIDENCE OF MALIGNANCY.           Review of Systems  A complete review of systems was performed. All systems are noted to be negative unless indicated in the history of present illness, impression, active problem list, or past histories.     Objective   Physical Exam    Assessment/Plan   Diagnoses and all orders for this visit:  Malignant neoplasm of prostate (Multi)  -     Prostate Specific Antigen; Future  Nocturia      69 y.o. male presents for a virtual follow up visit regarding a history of GG1 prostate cancer.  He is status post biopsy on 09/15/2020. Final pathology detailed prostatic adenocarcinoma, GG1 (Bernadette score 3+3=6), 1/2 cores (5% of tissue). He underwent an MRI of the prostate on 08/16/2024 that detailed a 63.8 gram prostate, BPH changes of the TZ, diffuse non nodular hypointensities within the PZ, without evidence of focally restricted diffusion (PI-RADS 2). PSA level is 9.94.    I personally reviewed the recently obtained PSA level. The PSA level was elevated at 9.94. The patient's PSA level is elevated. At this time, I do not see any urgency to complete treatment especially due to his recent negative prostate MRI. I have advised the patient that he may require require treatment in the future if his PSA level continues to rise.     I personally reviewed the patient's MRI of the prostate that was obtained on 08/16/2024. The patient's size of the prostate is twice as large as normal. The patient's prostate size is 63.8 grams. According to the impression of the results of the MRI of the prostate, there is a low chance that cancer is present.     Plan:  Continue active surveillance of prostate cancer    PSA in 5 months  FUV with Dr. Moser in [facility] Attestation   By signing my name below, I, Dylan Ramirez attestation that this documentation has been prepared under the direction and in the presence of Simon Sandy MD.

## 2025-07-07 ENCOUNTER — APPOINTMENT (OUTPATIENT)
Dept: UROLOGY | Facility: CLINIC | Age: 70
End: 2025-07-07
Payer: MEDICARE

## 2025-07-07 DIAGNOSIS — R35.1 NOCTURIA: ICD-10-CM

## 2025-07-07 DIAGNOSIS — C61 MALIGNANT NEOPLASM OF PROSTATE (MULTI): Primary | ICD-10-CM

## 2025-07-07 PROCEDURE — G2211 COMPLEX E/M VISIT ADD ON: HCPCS | Performed by: STUDENT IN AN ORGANIZED HEALTH CARE EDUCATION/TRAINING PROGRAM

## 2025-07-07 PROCEDURE — 99214 OFFICE O/P EST MOD 30 MIN: CPT | Performed by: STUDENT IN AN ORGANIZED HEALTH CARE EDUCATION/TRAINING PROGRAM

## 2025-07-07 PROCEDURE — 1036F TOBACCO NON-USER: CPT | Performed by: STUDENT IN AN ORGANIZED HEALTH CARE EDUCATION/TRAINING PROGRAM

## 2025-08-06 DIAGNOSIS — I10 BENIGN ESSENTIAL HYPERTENSION: ICD-10-CM

## 2025-08-06 RX ORDER — DOXAZOSIN 4 MG/1
4 TABLET ORAL NIGHTLY
Qty: 90 TABLET | Refills: 0 | Status: SHIPPED | OUTPATIENT
Start: 2025-08-06

## 2025-12-22 ENCOUNTER — APPOINTMENT (OUTPATIENT)
Dept: PRIMARY CARE | Facility: CLINIC | Age: 70
End: 2025-12-22
Payer: MEDICARE

## 2026-01-02 ENCOUNTER — APPOINTMENT (OUTPATIENT)
Dept: UROLOGY | Facility: CLINIC | Age: 71
End: 2026-01-02
Payer: MEDICARE

## 2026-06-29 ENCOUNTER — APPOINTMENT (OUTPATIENT)
Dept: PRIMARY CARE | Facility: CLINIC | Age: 71
End: 2026-06-29
Payer: MEDICARE

## (undated) DEVICE — CAUTERY, PENCIL, PUSH BUTTON, SMOKE EVAC, 70MM

## (undated) DEVICE — ELECTRODE, ELECTROSURGICAL, BLADE, INSULATED, ENT/IMA, STERILE

## (undated) DEVICE — ADHESIVE, SKIN, DERMABOND ADVANCED, 15CM, PEN-STYLE

## (undated) DEVICE — SUTURE, MONOCRYL, 4-0, 18 IN, PS2, UNDYED

## (undated) DEVICE — DRAPE, SHEET, THYROID, W/ARMBOARD COVER, 100 X 121 IN, DISPOSABLE, LF, STERILE

## (undated) DEVICE — SOLUTION, IRRIGATION, SODIUM CHLORIDE 0.9%, 1000 ML, POUR BOTTLE

## (undated) DEVICE — SUTURE, CTD, VICRYL, 2-0, UND, BR, CT-2

## (undated) DEVICE — DRAPE PACK, MINOR, CUSTOM, GEAUGA

## (undated) DEVICE — SUTURE, VICRYL PLUS 3-0, SH, 27IN

## (undated) DEVICE — SUTURE, VICRYL, 4-0, 18 IN, PS2, UNDYED

## (undated) DEVICE — NEEDLE, ELECTRODE, ELECTROSURGICAL, INSULATED

## (undated) DEVICE — PREP TRAY, SKIN, DRY, W/GLOVES